# Patient Record
Sex: MALE | Race: BLACK OR AFRICAN AMERICAN | Employment: FULL TIME | URBAN - METROPOLITAN AREA
[De-identification: names, ages, dates, MRNs, and addresses within clinical notes are randomized per-mention and may not be internally consistent; named-entity substitution may affect disease eponyms.]

---

## 2020-09-21 ENCOUNTER — OFFICE VISIT (OUTPATIENT)
Dept: INTERNAL MEDICINE CLINIC | Age: 40
End: 2020-09-21
Payer: COMMERCIAL

## 2020-09-21 VITALS
SYSTOLIC BLOOD PRESSURE: 124 MMHG | HEIGHT: 70 IN | DIASTOLIC BLOOD PRESSURE: 82 MMHG | WEIGHT: 230 LBS | OXYGEN SATURATION: 98 % | BODY MASS INDEX: 32.93 KG/M2 | TEMPERATURE: 98.1 F | HEART RATE: 88 BPM

## 2020-09-21 PROBLEM — E66.9 OBESITY (BMI 30-39.9): Status: ACTIVE | Noted: 2020-09-21

## 2020-09-21 PROBLEM — J44.1 COPD WITH ACUTE EXACERBATION (HCC): Status: ACTIVE | Noted: 2020-09-21

## 2020-09-21 PROBLEM — E11.9 CONTROLLED TYPE 2 DIABETES MELLITUS WITHOUT COMPLICATION, WITHOUT LONG-TERM CURRENT USE OF INSULIN (HCC): Status: ACTIVE | Noted: 2020-09-21

## 2020-09-21 PROBLEM — F17.200 SMOKER: Status: ACTIVE | Noted: 2020-09-21

## 2020-09-21 PROCEDURE — 4004F PT TOBACCO SCREEN RCVD TLK: CPT | Performed by: FAMILY MEDICINE

## 2020-09-21 PROCEDURE — 99204 OFFICE O/P NEW MOD 45 MIN: CPT | Performed by: FAMILY MEDICINE

## 2020-09-21 PROCEDURE — G8427 DOCREV CUR MEDS BY ELIG CLIN: HCPCS | Performed by: FAMILY MEDICINE

## 2020-09-21 PROCEDURE — G8926 SPIRO NO PERF OR DOC: HCPCS | Performed by: FAMILY MEDICINE

## 2020-09-21 PROCEDURE — 3046F HEMOGLOBIN A1C LEVEL >9.0%: CPT | Performed by: FAMILY MEDICINE

## 2020-09-21 PROCEDURE — 3023F SPIROM DOC REV: CPT | Performed by: FAMILY MEDICINE

## 2020-09-21 PROCEDURE — 2022F DILAT RTA XM EVC RTNOPTHY: CPT | Performed by: FAMILY MEDICINE

## 2020-09-21 PROCEDURE — G8417 CALC BMI ABV UP PARAM F/U: HCPCS | Performed by: FAMILY MEDICINE

## 2020-09-21 RX ORDER — PREDNISONE 20 MG/1
20 TABLET ORAL 2 TIMES DAILY
Qty: 10 TABLET | Refills: 0 | Status: SHIPPED | OUTPATIENT
Start: 2020-09-21 | End: 2020-09-26

## 2020-09-21 RX ORDER — AZITHROMYCIN 250 MG/1
250 TABLET, FILM COATED ORAL SEE ADMIN INSTRUCTIONS
Qty: 6 TABLET | Refills: 0 | Status: SHIPPED | OUTPATIENT
Start: 2020-09-21 | End: 2020-09-26

## 2020-09-21 SDOH — HEALTH STABILITY: MENTAL HEALTH: HOW OFTEN DO YOU HAVE A DRINK CONTAINING ALCOHOL?: NEVER

## 2020-09-21 ASSESSMENT — ENCOUNTER SYMPTOMS
SORE THROAT: 1
COUGH: 1
EYES NEGATIVE: 1
GASTROINTESTINAL NEGATIVE: 1

## 2020-09-21 NOTE — PROGRESS NOTES
Subjective:      Patient ID: Rosas Miranda is a 42 y.o. male. Cough   This is a new problem. The current episode started in the past 7 days. The problem has been unchanged. The problem occurs constantly. The cough is non-productive. Associated symptoms include a sore throat. The symptoms are aggravated by fumes and pollens. Risk factors for lung disease include smoking/tobacco exposure. He has tried nothing for the symptoms. The treatment provided no relief. His past medical history is significant for bronchitis and environmental allergies. Review of Systems   Constitutional: Negative. HENT: Positive for sore throat. Eyes: Negative. Respiratory: Positive for cough. Cardiovascular: Negative. Gastrointestinal: Negative. Endocrine: Negative. Musculoskeletal: Negative. Skin: Negative. Allergic/Immunologic: Positive for environmental allergies. Neurological: Negative. Hematological: Negative. Psychiatric/Behavioral: Negative. Past family and social history unremarkable. Diagnosis Orders   1. Encounter to establish care with new doctor     2. COPD with acute exacerbation (HCC)  Urine Drug Screen    HIV Rapid 1&2    Hepatitis Panel, Acute    CBC    Comprehensive Metabolic Panel    Hemoglobin A1C    Lipid Panel    Urinalysis with Microscopic    TSH without Reflex    XR CHEST STANDARD (2 VW)    Covid-19, Antibody, Total    azithromycin (ZITHROMAX) 250 MG tablet   3. Controlled type 2 diabetes mellitus without complication, without long-term current use of insulin (HCC)  Urine Drug Screen    HIV Rapid 1&2    Hepatitis Panel, Acute    CBC    Comprehensive Metabolic Panel    Hemoglobin A1C    Lipid Panel    Urinalysis with Microscopic    TSH without Reflex    XR CHEST STANDARD (2 VW)    Covid-19, Antibody, Total    azithromycin (ZITHROMAX) 250 MG tablet   4.  Smoker  Urine Drug Screen    HIV Rapid 1&2    Hepatitis Panel, Acute    CBC    Comprehensive Metabolic Panel Acute    CBC    Comprehensive Metabolic Panel    Hemoglobin A1C    Lipid Panel    Urinalysis with Microscopic    TSH without Reflex    XR CHEST STANDARD (2 VW)    Covid-19, Antibody, Total    azithromycin (ZITHROMAX) 250 MG tablet   3. Controlled type 2 diabetes mellitus without complication, without long-term current use of insulin (HCC)  Urine Drug Screen    HIV Rapid 1&2    Hepatitis Panel, Acute    CBC    Comprehensive Metabolic Panel    Hemoglobin A1C    Lipid Panel    Urinalysis with Microscopic    TSH without Reflex    XR CHEST STANDARD (2 VW)    Covid-19, Antibody, Total    azithromycin (ZITHROMAX) 250 MG tablet   4. Smoker  Urine Drug Screen    HIV Rapid 1&2    Hepatitis Panel, Acute    CBC    Comprehensive Metabolic Panel    Hemoglobin A1C    Lipid Panel    Urinalysis with Microscopic    TSH without Reflex    XR CHEST STANDARD (2 VW)    Covid-19, Antibody, Total    azithromycin (ZITHROMAX) 250 MG tablet   5. Obesity (BMI 30-39. 9)  Urine Drug Screen    HIV Rapid 1&2    Hepatitis Panel, Acute    CBC    Comprehensive Metabolic Panel    Hemoglobin A1C    Lipid Panel    Urinalysis with Microscopic    TSH without Reflex    XR CHEST STANDARD (2 VW)    Covid-19, Antibody, Total    azithromycin (ZITHROMAX) 250 MG tablet   6. 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 Patient states that he recently had DOT physical and was told that he has bronchitis. He is afebrile hemodynamically stable  Signs and symptoms and history suggestive of COPD exacerbation, tracheobronchitis. I had a long discussion with this individual regarding importance of smoke cessation, quit date, alternative to consider. He is advised to push more oral fluid, salt water gargles, over-the-counter Tylenol. I have ordered chest x-rays and blood work and COVID-19 antibiotic.   I have also ordered azithromycin, burst oral

## 2020-09-21 NOTE — LETTER
[facility]  [address] [place] [address]  Phone: [phone]  Fax: [phone]    Barber Vanegas MD        September 21, 2020     Patient: Pratik Eason   YOB: 1980   Date of Visit: 9/21/2020       To Whom It May Concern: It is my medical opinion that Pratik Eason may return to full duty immediately with no restrictions. If you have any questions or concerns, please don't hesitate to call.     Sincerely,        Barber Vanegas MD

## 2020-09-22 ENCOUNTER — HOSPITAL ENCOUNTER (OUTPATIENT)
Age: 40
End: 2020-09-22
Payer: COMMERCIAL

## 2020-09-22 ENCOUNTER — HOSPITAL ENCOUNTER (OUTPATIENT)
Age: 40
Discharge: HOME | End: 2020-09-22
Payer: COMMERCIAL

## 2020-09-22 ENCOUNTER — HOSPITAL ENCOUNTER (OUTPATIENT)
Dept: GENERAL RADIOLOGY | Age: 40
Discharge: HOME | End: 2020-09-24
Payer: COMMERCIAL

## 2020-09-22 LAB
-: ABNORMAL
ALBUMIN SERPL-MCNC: 4.3 G/DL (ref 3.5–5.2)
ALBUMIN/GLOBULIN RATIO: 1.6 (ref 1–2.5)
ALP BLD-CCNC: 92 U/L (ref 40–129)
ALT SERPL-CCNC: 33 U/L (ref 5–41)
AMORPHOUS: ABNORMAL
ANION GAP SERPL CALCULATED.3IONS-SCNC: 17 MMOL/L (ref 9–17)
AST SERPL-CCNC: 19 U/L
BACTERIA: ABNORMAL
BILIRUB SERPL-MCNC: 0.3 MG/DL (ref 0.3–1.2)
BILIRUBIN URINE: NEGATIVE
BUN BLDV-MCNC: 15 MG/DL (ref 6–20)
BUN/CREAT BLD: ABNORMAL (ref 9–20)
CALCIUM SERPL-MCNC: 9.5 MG/DL (ref 8.6–10.4)
CASTS UA: ABNORMAL /LPF (ref 0–8)
CHLORIDE BLD-SCNC: 106 MMOL/L (ref 98–107)
CHOLESTEROL/HDL RATIO: 5.1
CHOLESTEROL: 233 MG/DL
CO2: 20 MMOL/L (ref 20–31)
COLOR: YELLOW
CREAT SERPL-MCNC: 0.83 MG/DL (ref 0.7–1.2)
CRYSTALS, UA: ABNORMAL /HPF
EPITHELIAL CELLS UA: ABNORMAL /HPF (ref 0–5)
ESTIMATED AVERAGE GLUCOSE: 212 MG/DL
GFR AFRICAN AMERICAN: >60 ML/MIN
GFR NON-AFRICAN AMERICAN: >60 ML/MIN
GFR SERPL CREATININE-BSD FRML MDRD: ABNORMAL ML/MIN/{1.73_M2}
GFR SERPL CREATININE-BSD FRML MDRD: ABNORMAL ML/MIN/{1.73_M2}
GLUCOSE BLD-MCNC: 174 MG/DL (ref 70–99)
GLUCOSE URINE: NEGATIVE
HAV IGM SER IA-ACNC: NONREACTIVE
HBA1C MFR BLD: 9 % (ref 4–6)
HCT VFR BLD CALC: 46.3 % (ref 40.7–50.3)
HDLC SERPL-MCNC: 46 MG/DL
HEMOGLOBIN: 14.7 G/DL (ref 13–17)
HEPATITIS B CORE IGM ANTIBODY: NONREACTIVE
HEPATITIS B SURFACE ANTIGEN: NONREACTIVE
HEPATITIS C ANTIBODY: NONREACTIVE
HIV AG/AB: NONREACTIVE
KETONES, URINE: NEGATIVE
LDL CHOLESTEROL: 160 MG/DL (ref 0–130)
LEUKOCYTE ESTERASE, URINE: NEGATIVE
MCH RBC QN AUTO: 27.8 PG (ref 25.2–33.5)
MCHC RBC AUTO-ENTMCNC: 31.7 G/DL (ref 28.4–34.8)
MCV RBC AUTO: 87.7 FL (ref 82.6–102.9)
MUCUS: ABNORMAL
NITRITE, URINE: NEGATIVE
NRBC AUTOMATED: 0 PER 100 WBC
OTHER OBSERVATIONS UA: ABNORMAL
PDW BLD-RTO: 13 % (ref 11.8–14.4)
PH UA: 6 (ref 5–8)
PLATELET # BLD: 277 K/UL (ref 138–453)
PMV BLD AUTO: 11.3 FL (ref 8.1–13.5)
POTASSIUM SERPL-SCNC: 4.4 MMOL/L (ref 3.7–5.3)
PROTEIN UA: ABNORMAL
RBC # BLD: 5.28 M/UL (ref 4.21–5.77)
RBC UA: ABNORMAL /HPF (ref 0–4)
RENAL EPITHELIAL, UA: ABNORMAL /HPF
SARS-COV-2 ANTIBODY, TOTAL: NEGATIVE
SODIUM BLD-SCNC: 143 MMOL/L (ref 135–144)
SPECIFIC GRAVITY UA: 1.02 (ref 1–1.03)
TOTAL PROTEIN: 7 G/DL (ref 6.4–8.3)
TRICHOMONAS: ABNORMAL
TRIGL SERPL-MCNC: 133 MG/DL
TSH SERPL DL<=0.05 MIU/L-ACNC: 2.13 MIU/L (ref 0.3–5)
TURBIDITY: CLEAR
URINE HGB: NEGATIVE
UROBILINOGEN, URINE: NORMAL
VLDLC SERPL CALC-MCNC: ABNORMAL MG/DL (ref 1–30)
WBC # BLD: 9.2 K/UL (ref 3.5–11.3)
WBC UA: ABNORMAL /HPF (ref 0–5)
YEAST: ABNORMAL

## 2020-09-22 PROCEDURE — 83036 HEMOGLOBIN GLYCOSYLATED A1C: CPT

## 2020-09-22 PROCEDURE — 71046 X-RAY EXAM CHEST 2 VIEWS: CPT

## 2020-09-22 PROCEDURE — 85027 COMPLETE CBC AUTOMATED: CPT

## 2020-09-22 PROCEDURE — 87389 HIV-1 AG W/HIV-1&-2 AB AG IA: CPT

## 2020-09-22 PROCEDURE — 80074 ACUTE HEPATITIS PANEL: CPT

## 2020-09-22 PROCEDURE — 84443 ASSAY THYROID STIM HORMONE: CPT

## 2020-09-22 PROCEDURE — 86769 SARS-COV-2 COVID-19 ANTIBODY: CPT

## 2020-09-22 PROCEDURE — 81001 URINALYSIS AUTO W/SCOPE: CPT

## 2020-09-22 PROCEDURE — 80061 LIPID PANEL: CPT

## 2020-09-22 PROCEDURE — 36415 COLL VENOUS BLD VENIPUNCTURE: CPT

## 2020-09-22 PROCEDURE — 80053 COMPREHEN METABOLIC PANEL: CPT

## 2020-09-25 ENCOUNTER — TELEPHONE (OUTPATIENT)
Dept: INTERNAL MEDICINE CLINIC | Age: 40
End: 2020-09-25

## 2020-09-25 NOTE — LETTER
[facility]  [address]  Phone: [phone]  Fax: [phone]    Retta Brunner, MD        September 28, 2020     Patient: Rosas Miranda   YOB: 1980   Date of Visit: 9/25/2020        Rosas Miranda, patient states he has been exposed to covid 19  with subjective complaint of cough. Quarantine 14 days,may return after  14 day quarantine. If you have any questions or concerns, please don't hesitate to call.     Sincerely,        Retta Brunner, MD

## 2020-09-25 NOTE — TELEPHONE ENCOUNTER
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

## 2020-09-28 NOTE — TELEPHONE ENCOUNTER
Please order COVID-19 screen.   Advised to contact flu clinic  Please provide letter per patient request-patient states that he had possible exposure to COVID-19 with subjective complaint of cough

## 2020-09-28 NOTE — TELEPHONE ENCOUNTER
Left message for Frances August to return call,need more information from [address] [facility] [address] he have sx,has their child  tested positive.

## 2020-09-28 NOTE — TELEPHONE ENCOUNTER
PATIENT NOTIFIED EXPRESSED UNDERSTANDING, per [address] and letter printed  and placed in dr Russel Bautista on desk,RX CALLED INTO PHARMACY.

## 2020-09-28 NOTE — TELEPHONE ENCOUNTER
Pt reports he has a cough, his child has not been tested for covid. Child in the home has cough/SOB. Pt is requesting note for work to state the need for 2 weeks quarantine. Please advise.

## 2020-10-08 ENCOUNTER — HOSPITAL ENCOUNTER (OUTPATIENT)
Age: 40
Setting detail: SPECIMEN
Discharge: HOME | End: 2020-10-08
Payer: COMMERCIAL

## 2020-10-09 ENCOUNTER — HOSPITAL ENCOUNTER (OUTPATIENT)
Dept: DIABETES SERVICES | Age: 40
Setting detail: THERAPIES SERIES
Discharge: HOME | End: 2020-10-09
Payer: COMMERCIAL

## 2020-10-09 ENCOUNTER — TELEPHONE (OUTPATIENT)
Dept: DIABETES SERVICES | Age: 40
End: 2020-10-09

## 2020-10-09 PROCEDURE — G0108 DIAB MANAGE TRN  PER INDIV: HCPCS

## 2020-10-09 RX ORDER — LANCETS 30 GAUGE
1 EACH MISCELLANEOUS 3 TIMES DAILY
Qty: 90 EACH | Refills: 5 | Status: SHIPPED | OUTPATIENT
Start: 2020-10-09

## 2020-10-09 RX ORDER — GLUCOSAMINE HCL/CHONDROITIN SU 500-400 MG
CAPSULE ORAL
Qty: 90 STRIP | Refills: 5 | Status: SHIPPED | OUTPATIENT
Start: 2020-10-09

## 2020-10-09 SDOH — ECONOMIC STABILITY: FOOD INSECURITY: ADDITIONAL INFORMATION: NO

## 2020-10-09 ASSESSMENT — PROBLEM AREAS IN DIABETES QUESTIONNAIRE (PAID)
PAID-5 TOTAL SCORE: 5
FEELING THAT DIABETES IS TAKING UP TOO MUCH OF YOUR MENTAL AND PHYSICAL ENERGY EVERY DAY: 2
WORRYING ABOUT THE FUTURE AND THE POSSIBILITY OF SERIOUS COMPLICATIONS: 1
FEELING SCARED WHEN YOU THINK ABOUT LIVING WITH DIABETES: 0
COPING WITH COMPLICATIONS OF DIABETES: 1
FEELING DEPRESSED WHEN YOU THINK ABOUT LIVING WITH DIABETES: 1

## 2020-10-09 NOTE — PROGRESS NOTES
Please  Note patient DMED assessment done today  - Patient need order for a BG meter and strips and lancets for testing BG at home -   he would like to test 2 - 3 times per day and bring a BG log into you at next visit. He has follow DMED session planned for 4 weeks -He is working on goals to eat less CHO / starchy foods and be more active daily. He is taking metformin 1000 mg BID daily now and tolerating.     Thanks  Rody Laura RN CDE

## 2020-10-09 NOTE — PROGRESS NOTES
Diabetes Self- Management Education Program Assessment - PHONE  This visit was done on the telephone  (During LZHND-27 public health emergency). Pursuant to the emergency declaration under the [facility], [address] [place] [address] authority and the [employer] Act, this visit was conducted with patient's (and/or legal guardian's) consent, to reduce the patient's risk of exposure to COVID-19 and provide necessary medical care. The patient (and/or legal guardian) has also been advised to contact this office for worsening conditions or problems, and seek emergency medical treatment and/or call 911 if deemed necessary. Patient identification was verified at the start of the visit: Yes    Total time spent for this encounter: 1 hour  - this encounter was done as one on one virtual /  phone visit as no group sessions being offered for 2 months due to covid - 19 pandemic    Services were provided through a phone discussion to substitute for in-person clinic visit. Patient and provider were located at their individual home/office. Also see Diabetic Screening  Patient, Andree Smith, contacted via phone call encounters for diabetes self-management education assessment on 10/9/20       MEDICAL HISTORY:  No past medical history on file. No family history on file. Patient has no known allergies. There is no immunization history on file for this patient.   Current Medications  Current Outpatient Medications   Medication Sig Dispense Refill    metFORMIN (GLUCOPHAGE) 1000 MG tablet Take 1 tablet by mouth 2 times daily (with meals) 60 tablet 0    fluticasone-umeclidin-vilant (TRELEGY ELLIPTA) 100-62.5-25 MCG/INH AEPB Inhale 1 puff into the lungs daily 1 each 0    Spacer/Aero-Holding Chambers (E-Z SPACER) ZULAY 1 Device by Does not apply route daily as needed (sob) 1 Device 0     No current facility-administered medications for this encounter.    :     Comments:  Allergies:  No Known Allergies    A1C blood level   Lab Results   Component Value Date    LABA1C 9.0 (H) 09/22/2020     Lab Results   Component Value Date    CREATININE 0.83 09/22/2020       Blood pressure   BP Readings from Last 3 Encounters:   09/21/20 124/82        Cholesterol  Lab Results   Component Value Date    LDLCHOLESTEROL 160 (H) 09/22/2020        Diabetes Self- Management Education Record    Participant Name: Ricky Barboza  Referring Provider: Lashonda Esparza MD   Assessment/Evaluation Ratings:  1=Needs Instruction   4=Demonstrates Understanding/Competency  2=Needs Review   NC=Not Covered    3=Comprehends Key Points  N/A=Not Applicable  Topics/Learning Objectives Pre-session Assess Date:  10/9/20rs Instr. Date Reinforce Date Post- session Eval Comments   Diabetes disease process & Treatment process: Define diabetes & pre-diabetes; Identify own type of diabetes; role of the pancreas; signs/symptoms; diagnostic criteria; prevention & treatment options; contributing factors. 2    Stated DM DX in 2007 initally - as he left service - tried metformin did not work, then was told was type 1 and was on lantus and humalog ( low doses) - but then worked on wt loss by low CHO and being active and was stopped all meds- more recent 2020   changed jobs and coivd restiction and less activity - then had symptoms of dm - fatigue and frequent- PCP care in Sept and a1c up to 9%    Incorporating nutritional management into lifestyle: Describe effect of type, amount & timing of food on blood glucose;  Describe basic meal planning techniques & current nutrition guideline     Work odd hours no regular times     Main beverages water - kambucha lemonade - try to stay away from pop - some cranberry juices and Gatorade    Likes to fruits and veggies     Meals at home -  BK  eggs and cheese and pepper  BK   Steaks and 2 has brown and grits     Dinner :   cheese steak from take out What to eat - Food groups, When to eat - timing of meals and snacks, and How much to eat - portions control. calories/ day   CHO choices/ meal   CHO choices/  day   grams of protein /day   gram of fat /day     Correctly read food labels & demonstrate CHO counting & portion control with personalized meal plan. Identify dining out strategies, & dietary sick day guidelines. 1       Incorporating physical activity into lifestyle:   Verbalize effect of exercise on blood glucose levels; benefits of regular exercise; safety considerations; contraindications; maintenance of activity. 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 2    - insulin in past   Monitoring blood glucose, interpreting and using results:  Identify recommended & personal blood glucose targets; importance of testing; testing supplies; HgbA1C target levels; Factors affecting blood glucose; Importance of logging blood glucose levels for pattern recognition; ketone testing; safe lancet disposal.   2    Check BG  In past  - needs a meter - requested meter and ordered by pcp today - discussed plan to check BG fasting and post meals and take BG log to next pcp appt 10/20     Prevention, detection & treatment of acute complications:  Identify symptoms of hyper & hypoglycemia, and prevention & treatment strategies. 1    Has classic high s/s   Describe sick day guidelines & indications for  physician notification. Identify short term consequences of poor control.  Disaster preparedness strategies           Prevention, detection & treatment of chronic complications:  Define the natural course of diabetes & describe the relationship of blood glucose levels to long term complications of diabetes. Identify preventative measures & standards of care. 1    -stated he did see foot MD and N/t in feet and c/o about nail beds  - eye checked done yearly at [place]      Developing strategies to address psychosocial issues:  Describe feelings about living with diabetes; Describe how stress, depression & anxiety affect blood glucose; Identify coping strategies; Identify support needed & support network available. 1    - he was hard on self  - need to work on motivation - wife   - has apps in past - thinking about restarting tracking   Paid 5    Developing strategies to promote health/change behavior: Identify 7 self-care behaviors; Personal health risk factors; Benefits, challenges & strategies for behavioral change;    1         Individualized goal selection. My goal , to help me improve my health, I will:   1. Healthy eating -  Cut back on portions of starchy foods like potatotes      2. Monitoring  - start to check and log BG and take log to follow up PCP     3. Be more active - use bike for PA and keep up with walking more step/ short bouts daily        Plan  Follow-up Appointments planned  In one month  - patient stated he want to be accountable for his goals and to keep up with motivation    Instruction Method: [x]Lecture/Discussion  []Power Point Presentation  [x]Handouts  []Return Demonstration    Education Materials/Equipment Provided (VIA Mail for phone visits)  :    [x]Self-Management - Initial assessment - Enrolment in to ADA  Where do I Begin, Living with Type 2 diabetes ADA home support program and  handout on diabetes education classes.      []Self-Management  Class 1 -Self-Management  Class 1 - \"Diabetes - your take control guide\" - ADA booklet -  o  \"ready, set, start counting\" teaching sheet in diet section   o  GLP-1 understanding 8 core deficits puzzle sheet in the medication section  o one day food diary and envelope for return of diet HX   o  You tube and website resource sheet-Understanding Type 2 Diabetes from Animated Diabetes Patient https://youtu. be/WCjDu33wRNB      [] Self-Management  Class 2 - Meal Plan and handout for serving sizes, smarter snacking, Ready Set Carb Counting / Plate Method, Nutrient Conversion and International Diabetes [address] Eating for People with Diabetes and Nutrition in the [employer] - fast facts about fast food    [] Self-Management  Class 3 -  Diabetes your take control guide pages 20 - 30,  type 2 diabetes and the role of GLP- 1,  Individualized Diabetes report card     [] Self-Management Class 4 - BD Booklet  Sick Day Rules and  Dinning Out Guide , recipe hand outs and tips, diabetes Cookbooks  ( when available)     []Self-Management - 3 month follow -  AADE7 Self care behaviors work sheets, 2020 [employer],  Online resource list - March 2020      []Self-Management  Gestational - RN class -Resource materials sent out : care booklet - \" Gestational Diabetes Mellitus ( GDM) toolkit form [place] gestational diabetes postpartum care learning collaborative 2018. \"Simple Guidelines for meal planning with gestational diabetes. SMBG sheets to fax back to MFM weekly. BD  healthy injection site selection and rotation with 6 mm insulin syringe and 4 mm pen needle. Gestational diabetes handout from [facility] 2016. Did you have gestational diabetes when you were pregnant?  Handout from [facility]  April 2014    []Self-Management Gestational - RD class - My Food Plan for Gestational diabetes    []Glucose Meter     []Insulin Kit     []Other      Encounter Type Date Start Time End Time Comments No Show Dates   Assessment   10/9/20rs  1000  1100    []In Person  [x]Telephone    Class 1 - Understanding diabetes []TelephoneAmerican Diabetes Association  www. diabetes. org    Class 2- Nutrition and diabetes      []Telephone  Healthy Eating with Diabetes- Automatic Data of Diabetes and Digestive and Kidney   https://youtu. be/aa4yl1Uu6E9    Class 3 - Preventing Complications     []Telephone    Class 4 -  In depth Nutrition and sick day care    []Telephone  Diabetes Food hub  www. diabetesfoodhub.org     Class 5 - 3 month follow up / goal reassessment        Gestational - RN         Gestational - RD        Individual MNT         Shared Med Appt         Yearly Follow-up        Meter Instrx      How to Measure Your Blood Sugar - [facility] Patient Education  https://youtu. be/nxIJeHWlhF4    Insulin Instrx      []Pen  []Vial & Syringe   BD Diabetes Care: How to Inject Insulin with a Pen Needle  https://youtu. be/[email or web address]    Diabetes Care: How to Inject Insulin with a Syringe  https://youtu. be/9uSSBu-5eSY       DSMS Support :   [] MNT      [] Annual update     [] Starting Fresh  adults living with diabetes or pre diabetes. [address] [place], [place]    [id number]:30pm- on 6 week rotation  Free -  REGISTRATION IS REQUIRED - [facility] [phone] call for dates    []  Diabetes Group at  [address] - Free 6 week diabetes education support   classes - contact : Gia Johnson [phone]  for dates and locations      []ADA  Where do I Begin, Living with Type 2 diabetes ADA home support program    Web site: diabetes. org/living    Call: 1800 DIABETES  e-mail: Nelsy@[employer]. org     []  Internet web sites - ADAWeb site: www.diabetes. org       Post Education Referrals:      [] [place] Tobacco Quit information sheet and [address] , [address]       [] Dental care - [facility]     [] [facility] ([facility]) link  phone number - for information and referral to [address]  Clinically  [address], FOOT, CARDIAC, WOUND, WEIGHT MANAGEMENT        []Other  REKHA

## 2020-10-11 LAB — SARS-COV-2, NAA: NOT DETECTED

## 2020-10-12 ENCOUNTER — TELEPHONE (OUTPATIENT)
Dept: PRIMARY CARE CLINIC | Age: 40
End: 2020-10-12

## 2020-10-19 NOTE — TELEPHONE ENCOUNTER
Alejandro Giron is calling to request a refill on the following medication(s):    Medication Request:  Requested Prescriptions     Pending Prescriptions Disp Refills    metFORMIN (GLUCOPHAGE) 1000 MG tablet [Pharmacy Med Name: METFORMIN HCL 1,000 MG TABLET] 60 tablet 0     Sig: TAKE 1 TABLET BY MOUTH TWICE A DAY WITH MEALS     Last filled 9/23/20 30 day no refill  Last Visit Date (If Applicable):  9/59/9320    Next Visit Date:    10/20/2020

## 2020-10-20 ENCOUNTER — OFFICE VISIT (OUTPATIENT)
Dept: INTERNAL MEDICINE CLINIC | Age: 40
End: 2020-10-20
Payer: COMMERCIAL

## 2020-10-20 VITALS
DIASTOLIC BLOOD PRESSURE: 70 MMHG | RESPIRATION RATE: 20 BRPM | HEART RATE: 93 BPM | WEIGHT: 231 LBS | SYSTOLIC BLOOD PRESSURE: 118 MMHG | BODY MASS INDEX: 33.07 KG/M2 | TEMPERATURE: 97.1 F | HEIGHT: 70 IN | OXYGEN SATURATION: 98 %

## 2020-10-20 PROBLEM — J44.9 COPD (CHRONIC OBSTRUCTIVE PULMONARY DISEASE) (HCC): Status: ACTIVE | Noted: 2020-10-20

## 2020-10-20 PROBLEM — J44.1 COPD WITH ACUTE EXACERBATION (HCC): Status: RESOLVED | Noted: 2020-09-21 | Resolved: 2020-10-20

## 2020-10-20 PROBLEM — E11.9 CONTROLLED TYPE 2 DIABETES MELLITUS WITHOUT COMPLICATION, WITHOUT LONG-TERM CURRENT USE OF INSULIN (HCC): Status: RESOLVED | Noted: 2020-09-21 | Resolved: 2020-10-20

## 2020-10-20 PROBLEM — E78.2 MIXED HYPERLIPIDEMIA: Status: ACTIVE | Noted: 2020-10-20

## 2020-10-20 PROBLEM — I10 ESSENTIAL HYPERTENSION: Status: ACTIVE | Noted: 2020-10-20

## 2020-10-20 PROBLEM — E11.65 UNCONTROLLED TYPE 2 DIABETES MELLITUS WITH HYPERGLYCEMIA (HCC): Status: ACTIVE | Noted: 2020-10-20

## 2020-10-20 PROCEDURE — G8926 SPIRO NO PERF OR DOC: HCPCS | Performed by: FAMILY MEDICINE

## 2020-10-20 PROCEDURE — 3052F HG A1C>EQUAL 8.0%<EQUAL 9.0%: CPT | Performed by: FAMILY MEDICINE

## 2020-10-20 PROCEDURE — 99214 OFFICE O/P EST MOD 30 MIN: CPT | Performed by: FAMILY MEDICINE

## 2020-10-20 PROCEDURE — 4004F PT TOBACCO SCREEN RCVD TLK: CPT | Performed by: FAMILY MEDICINE

## 2020-10-20 PROCEDURE — G8484 FLU IMMUNIZE NO ADMIN: HCPCS | Performed by: FAMILY MEDICINE

## 2020-10-20 PROCEDURE — 3023F SPIROM DOC REV: CPT | Performed by: FAMILY MEDICINE

## 2020-10-20 PROCEDURE — G8427 DOCREV CUR MEDS BY ELIG CLIN: HCPCS | Performed by: FAMILY MEDICINE

## 2020-10-20 PROCEDURE — 90471 IMMUNIZATION ADMIN: CPT | Performed by: FAMILY MEDICINE

## 2020-10-20 PROCEDURE — 90732 PPSV23 VACC 2 YRS+ SUBQ/IM: CPT | Performed by: FAMILY MEDICINE

## 2020-10-20 PROCEDURE — 2022F DILAT RTA XM EVC RTNOPTHY: CPT | Performed by: FAMILY MEDICINE

## 2020-10-20 PROCEDURE — G8417 CALC BMI ABV UP PARAM F/U: HCPCS | Performed by: FAMILY MEDICINE

## 2020-10-20 RX ORDER — ATORVASTATIN CALCIUM 40 MG/1
40 TABLET, FILM COATED ORAL DAILY
Qty: 90 TABLET | Refills: 0 | Status: SHIPPED | OUTPATIENT
Start: 2020-10-20 | End: 2021-01-18

## 2020-10-20 RX ORDER — ASPIRIN 81 MG/1
81 TABLET ORAL DAILY
Qty: 200 TABLET | Refills: 1 | Status: SHIPPED | OUTPATIENT
Start: 2020-10-20

## 2020-10-20 RX ORDER — LISINOPRIL 5 MG/1
5 TABLET ORAL DAILY
Qty: 90 TABLET | Refills: 1 | Status: SHIPPED | OUTPATIENT
Start: 2020-10-20 | End: 2021-05-25

## 2020-10-20 ASSESSMENT — PATIENT HEALTH QUESTIONNAIRE - PHQ9
SUM OF ALL RESPONSES TO PHQ9 QUESTIONS 1 & 2: 0
1. LITTLE INTEREST OR PLEASURE IN DOING THINGS: 0
SUM OF ALL RESPONSES TO PHQ QUESTIONS 1-9: 0
2. FEELING DOWN, DEPRESSED OR HOPELESS: 0
SUM OF ALL RESPONSES TO PHQ QUESTIONS 1-9: 0
SUM OF ALL RESPONSES TO PHQ QUESTIONS 1-9: 0

## 2020-10-20 ASSESSMENT — ENCOUNTER SYMPTOMS
ALLERGIC/IMMUNOLOGIC NEGATIVE: 1
GASTROINTESTINAL NEGATIVE: 1
EYES NEGATIVE: 1
COUGH: 1

## 2020-10-20 ASSESSMENT — COPD QUESTIONNAIRES: COPD: 1

## 2020-10-20 NOTE — LETTER
[facility]  [address] [place] [address]  Phone: [phone]  Fax: [phone]    Ofelia Booker MD        October 20, 2020     Patient: Leena Rice   YOB: 1980   Date of Visit: 10/20/2020       To Whom it May Concern:    Leena Rice was seen in my clinic on 10/20/2020. He may return to work on 10/21/20. If you have any questions or concerns, please don't hesitate to call.     Sincerely,         Ofelia Booker MD

## 2020-10-20 NOTE — PROGRESS NOTES
Subjective:      Patient ID: Alverto Gusman is a 42 y.o. male. COPD   He complains of cough. This is a chronic problem. The current episode started more than 1 month ago. The problem occurs intermittently. The problem has been waxing and waning. The cough is non-productive. His symptoms are aggravated by exposure to smoke, change in weather, pollen and exposure to fumes. His symptoms are alleviated by beta-agonist, steroid inhaler and leukotriene antagonist. He reports moderate improvement on treatment. Risk factors for lung disease include smoking/tobacco exposure. His past medical history is significant for COPD. Review of Systems   Constitutional: Negative. HENT: Negative. Eyes: Negative. Respiratory: Positive for cough. Cardiovascular: Negative. Gastrointestinal: Negative. Endocrine: Negative. Musculoskeletal: Negative. Skin: Negative. Allergic/Immunologic: Negative. Neurological: Negative. Hematological: Negative. Psychiatric/Behavioral: Negative. Past family and social history unremarkable. Diagnosis Orders   1. Uncontrolled type 2 diabetes mellitus with hyperglycemia [address]   2. Smoker     3. Obesity (BMI 30-39.9)     4. Chronic obstructive pulmonary disease, unspecified COPD type ([address]     5. Need for pneumococcal vaccination  PNEUMOVAX 23 subcutaneous/IM (Pneumococcal polysaccharide vaccine 23-valent >= 3yo)   6. Need for Tdap vaccination  Tetanus-Diphth-Acell Pertussis (BOOSTRIX) injection 0.5 mL   7. Mixed hyperlipidemia     8. Essential hypertension           Objective:   Physical Exam  Vitals signs and nursing note reviewed. Constitutional:       Appearance: He is well-developed. HENT:      Head: Normocephalic and atraumatic.       Right Ear: External ear normal.      Left Ear: External ear normal.      Nose: Nose normal.   Eyes:      Conjunctiva/sclera: Conjunctivae normal.      Pupils: Pupils are equal, round, advised to keep Accu-Chek log for office review, adhere to ADA 1800 diet, daily moderate exercise and lifestyle change  Hypertension. Well-controlled with random blood pressure equal less than 130/80. Will be placed on low-dose ACE inhibitor  Hyperlipidemia. Emphasized importance of euglycemia. He will be placed on statin. Caution myalgia and elevated LFTs  He will be placed on low-dose aspirin  Monofilament testing is unremarkable. He is counseled on diabetic foot care  He is updated on dilated eye exam.  No diabetic changes as per patient information  Patient states that in the past he was on insulin and brought his A1c down to 6.5. We will continue the remain open to that option  COPD. He continues to smoke. Once again he is counseled, quit date, alternative to consider. He is responding appropriately to Trelegy  Depression screen is negative  He denies excessive alcohol or illicit drug use  Met distribute terminal adjustment as dictated above. Compliance is advised. He is advised to bring Accu-Chek log at each visit  Follow-up in 3 months  This note is created with a voice recognition program and while intend to generate a document that accurately reflects the content of the visit, no guarantee can be provided that every mistake has been identified and corrected by editing.           Rosana Frazier MD

## 2020-11-28 ENCOUNTER — PATIENT MESSAGE (OUTPATIENT)
Dept: INTERNAL MEDICINE CLINIC | Age: 40
End: 2020-11-28

## 2020-11-30 NOTE — TELEPHONE ENCOUNTER
Robin Franco is calling to request a refill on the following medication(s):    Medication Request:  Requested Prescriptions     Pending Prescriptions Disp Refills    metFORMIN (GLUCOPHAGE) 1000 MG tablet [Pharmacy Med Name: METFORMIN HCL 1,000 MG TABLET] 60 tablet 0     Sig: TAKE 1 TABLET BY MOUTH TWICE A DAY WITH MEALS     Last filled 10/19/20 30 day  Order pending    Last Visit Date (If Applicable):  81/12/0106    Next Visit Date:    01/19/21

## 2020-11-30 NOTE — TELEPHONE ENCOUNTER
From: Marya Woodruff  To: Belvie Jeans, MD  Sent: 11/28/2020 11:57 AM EST  Subject: Prescription Question    Is there anyway I could have my metformin refilled today? I do not have enough to last the weekend. ..

## 2021-02-09 RX ORDER — ATORVASTATIN CALCIUM 40 MG/1
TABLET, FILM COATED ORAL
Qty: 20 TABLET | Refills: 0 | Status: SHIPPED | OUTPATIENT
Start: 2021-02-09 | End: 2021-03-22

## 2021-02-09 NOTE — TELEPHONE ENCOUNTER
Finn Gresham is calling to request a refill on the following medication(s):    Medication Request:  Requested Prescriptions     Pending Prescriptions Disp Refills    atorvastatin (LIPITOR) 40 MG tablet 90 tablet 1     Sig: TAKE 1 TABLET BY MOUTH EVERY DAY       Last Visit Date (If Applicable):  13/85/2370    Next Visit Date:    Visit date not found

## 2021-02-22 DIAGNOSIS — E11.65 UNCONTROLLED TYPE 2 DIABETES MELLITUS WITH HYPERGLYCEMIA (HCC): ICD-10-CM

## 2021-02-22 NOTE — TELEPHONE ENCOUNTER
Natalia Tobin is calling to request a refill on the following medication(s):    Medication Request:  Requested Prescriptions     Pending Prescriptions Disp Refills    metFORMIN (GLUCOPHAGE) 1000 MG tablet [Pharmacy Med Name: METFORMIN HCL 1,000 MG TABLET] 180 tablet 0     Sig: TAKE 1 TABLET BY MOUTH TWICE A DAY WITH MEALS     Last filled 11/30/20 90 day no refill  Order pending    Last Visit Date (If Applicable):  40/23/1068    Next Visit Date:    3/11/2021

## 2021-02-25 ENCOUNTER — HOSPITAL ENCOUNTER (EMERGENCY)
Age: 41
Discharge: HOME | End: 2021-02-26
Attending: EMERGENCY MEDICINE
Payer: COMMERCIAL

## 2021-02-25 VITALS
HEIGHT: 70 IN | OXYGEN SATURATION: 100 % | SYSTOLIC BLOOD PRESSURE: 144 MMHG | BODY MASS INDEX: 32.93 KG/M2 | TEMPERATURE: 98 F | DIASTOLIC BLOOD PRESSURE: 84 MMHG | WEIGHT: 230 LBS | RESPIRATION RATE: 16 BRPM | HEART RATE: 91 BPM

## 2021-02-25 DIAGNOSIS — F41.1 GENERALIZED ANXIETY DISORDER: Primary | ICD-10-CM

## 2021-02-25 PROCEDURE — 99284 EMERGENCY DEPT VISIT MOD MDM: CPT

## 2021-02-25 RX ORDER — LORAZEPAM 1 MG/1
1 TABLET ORAL ONCE
Status: COMPLETED | OUTPATIENT
Start: 2021-02-26 | End: 2021-02-26

## 2021-02-26 PROCEDURE — 6370000000 HC RX 637 (ALT 250 FOR IP): Performed by: EMERGENCY MEDICINE

## 2021-02-26 RX ORDER — ALPRAZOLAM 0.5 MG/1
0.5 TABLET ORAL NIGHTLY PRN
Qty: 10 TABLET | Refills: 0 | Status: SHIPPED | OUTPATIENT
Start: 2021-02-26 | End: 2021-03-28

## 2021-02-26 RX ADMIN — LORAZEPAM 1 MG: 1 TABLET ORAL at 00:17

## 2021-02-26 NOTE — ED PROVIDER NOTES
EMERGENCY DEPARTMENT ENCOUNTER    Pt Name: Ellyn Jane  MRN: [id number]  [place] 1980  Date of evaluation: 2/26/21  CHIEF COMPLAINT       Chief Complaint   Patient presents with    Panic Attack    Mental Health Problem     not sure if he wants to hurt himself, has felt this way before. appt scheduled at [facility] but not for another month     HISTORY OF PRESENT ILLNESS   Patient is a 79-year-old male presents to ED for evaluation of anxiety. 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 He states he is wife just lost a child. He has 6 daughters at home. He is a . Accumulation of life stressors has increased his anxiety. SI, HI, hallucinations. Also no fevers, cough, shortness of breath, chest pain, abdominal pain, nausea, vomiting, changes in urine or stool. REVIEW OF SYSTEMS     Review of Systems   All other systems reviewed and are negative. PASTMEDICAL HISTORY   History reviewed. No pertinent past medical history.   SURGICAL HISTORY       Past Surgical History:   Procedure Laterality Date    PATELLAR TENDON REPAIR       CURRENT MEDICATIONS       Previous Medications    ASPIRIN EC 81 MG EC TABLET    Take 1 tablet by mouth daily    ATORVASTATIN (LIPITOR) 40 MG TABLET    TAKE 1 TABLET BY MOUTH EVERY DAY    BLOOD GLUCOSE MONITOR STRIPS    Test 3 times a day    BLOOD GLUCOSE MONITORING SUPPL (ACURA BLOOD GLUCOSE METER) W/DEVICE KIT    Test 2-3 times a day    FLUTICASONE-UMECLIDIN-VILANT (TRELEGY ELLIPTA) 100-62.5-25 MCG/INH AEPB    Inhale 1 puff into the lungs daily    LANCETS MISC    1 each by Does not apply route 3 times daily    LISINOPRIL (PRINIVIL;ZESTRIL) 5 MG TABLET    Take 1 tablet by mouth daily    METFORMIN (GLUCOPHAGE) 1000 MG TABLET    TAKE 1 TABLET BY MOUTH TWICE A DAY WITH MEALS    SITAGLIPTIN (JANUVIA) 100 MG TABLET    Take 1 tablet by mouth daily    SPACER/AERO-HOLDING CHAMBERS (E-Z SPACER) ZULAY    1 Device by Does not apply route daily as needed (sob)     ALLERGIES     has No Known Allergies. FAMILY HISTORY     He indicated that his mother is alive. He indicated that his father is alive. SOCIAL HISTORY       Social History     Tobacco Use    Smoking status: Current Every Day Smoker     Packs/day: 1.00     Years: 19.00     Pack years: 19.00     Types: Cigarettes     Start date: 9/21/2001    Smokeless tobacco: Never Used   Substance Use Topics    Alcohol use: Never     Frequency: Never    Drug use: Never     PHYSICAL EXAM     INITIAL VITALS: BP (!) 144/84   Pulse 91   Temp 98 °F (36.7 °C) (Oral)   Resp 16   Ht 5' 10\" (1.778 m)   Wt 230 lb (104.3 kg)   SpO2 100%   BMI 33.00 kg/m²    Physical Exam  HENT:      Head: Normocephalic. Right Ear: External ear normal.      Left Ear: External ear normal.      Nose: Nose normal.   Eyes:      Conjunctiva/sclera: Conjunctivae normal.   Cardiovascular:      Rate and Rhythm: Normal rate. Pulmonary:      Effort: Pulmonary effort is normal.   Abdominal:      General: Abdomen is flat. Skin:     General: Skin is dry. Neurological:      Mental Status: He is alert. Mental status is at baseline. Psychiatric:         Mood and Affect: Mood normal.         Behavior: Behavior normal.      Comments:     Limited exam secondary to Covid-19 pandemic. MEDICAL DECISION MAKING:   The patient is hemodynamically stable, afebrile, nontoxic-appearing. Physical exam unremarkable. Based on history and exam high clinical suspicion for anxiety/PTSD. ED plan for Ativan, reassess         DIAGNOSTIC RESULTS   EKG:All EKG's are interpreted by the Emergency Department Physician who either signs or Co-signs this chart in the absence of a cardiologist.        RADIOLOGY:All plain film, CT, MRI, and formal ultrasound images (except ED bedside ultrasound) are read by the radiologist, see reports below, unless otherwisenoted in MDM or here.   No orders to display     LABS: All lab results were reviewed by myself, and all abnormals are listed below. Labs Reviewed - No data to display    EMERGENCY DEPARTMENTCOURSE:   Patient did well in the ED. Symptoms improved with Ativan. Given Rx for Xanax. Counseled his wife that he cannot operate his truck or other heavy machinery while taking this medication. He has a follow-up appointment at the [facility]. On reexamination no SI, HI, or hallucinations. Patient does not pose a risk to self or others. No further work-up indicated this time. Nursing notes reviewed. At this time this is what I find, the patient appears well and does not appear sick or toxic. I gave my usual and customary discussion of the risks and benefits of discharge versus admission. I answered the patient's questions. I gave the patient strict return precautions. Patient expressed understanding of the discharge instructions. Dictated but not reviewed. Vitals:    Vitals:    02/25/21 2337 02/25/21 2340   BP:  (!) 144/84   Pulse: 91    Resp: 16    Temp: 98 °F (36.7 °C)    TempSrc: Oral    SpO2: 100%    Weight: 230 lb (104.3 kg)    Height: 5' 10\" (1.778 m)        The patient was given the following medications while in the emergency department:  Orders Placed This Encounter   Medications    LORazepam (ATIVAN) tablet 1 mg    ALPRAZolam (XANAX) 0.5 MG tablet     Sig: Take 1 tablet by mouth nightly as needed for Anxiety for up to 30 days. Do not drive your truck or other vehicles/heavy machinery on this medication. Do not take alcohol while taking this medication. Dispense:  10 tablet     Refill:  0     CONSULTS:  None    FINAL IMPRESSION      1.  Generalized anxiety disorder          DISPOSITION/PLAN   DISPOSITION Decision To Discharge 02/26/2021 12:38:23 AM      PATIENT REFERRED TO:  Patrick Wagner MD  [address] [address] [facility] [address]  [phone]    In 2 days      DISCHARGE MEDICATIONS:  New Prescriptions ALPRAZOLAM (XANAX) 0.5 MG TABLET    Take 1 tablet by mouth nightly as needed for Anxiety for up to 30 days. Do not drive your truck or other vehicles/heavy machinery on this medication. Do not take alcohol while taking this medication.      Gerilyn Dakin, MD  Attending Emergency Physician                    Donna Smyth MD  02/26/21 1642

## 2021-03-01 ENCOUNTER — OFFICE VISIT (OUTPATIENT)
Dept: INTERNAL MEDICINE CLINIC | Age: 41
End: 2021-03-01
Payer: COMMERCIAL

## 2021-03-01 ENCOUNTER — HOSPITAL ENCOUNTER (OUTPATIENT)
Age: 41
Setting detail: SPECIMEN
Discharge: HOME | End: 2021-03-01
Payer: COMMERCIAL

## 2021-03-01 VITALS
OXYGEN SATURATION: 98 % | RESPIRATION RATE: 16 BRPM | HEIGHT: 70 IN | DIASTOLIC BLOOD PRESSURE: 90 MMHG | TEMPERATURE: 97.3 F | HEART RATE: 83 BPM | BODY MASS INDEX: 32.64 KG/M2 | WEIGHT: 228 LBS | SYSTOLIC BLOOD PRESSURE: 130 MMHG

## 2021-03-01 DIAGNOSIS — F39 MOOD DISORDER (HCC): ICD-10-CM

## 2021-03-01 DIAGNOSIS — E11.65 UNCONTROLLED TYPE 2 DIABETES MELLITUS WITH HYPERGLYCEMIA (HCC): ICD-10-CM

## 2021-03-01 DIAGNOSIS — E66.9 OBESITY (BMI 30-39.9): ICD-10-CM

## 2021-03-01 DIAGNOSIS — E78.2 MIXED HYPERLIPIDEMIA: ICD-10-CM

## 2021-03-01 DIAGNOSIS — E11.65 UNCONTROLLED TYPE 2 DIABETES MELLITUS WITH HYPERGLYCEMIA (HCC): Primary | ICD-10-CM

## 2021-03-01 DIAGNOSIS — J44.9 CHRONIC OBSTRUCTIVE PULMONARY DISEASE, UNSPECIFIED COPD TYPE (HCC): ICD-10-CM

## 2021-03-01 DIAGNOSIS — G89.29 CHRONIC PAIN OF RIGHT KNEE: ICD-10-CM

## 2021-03-01 DIAGNOSIS — I10 ESSENTIAL HYPERTENSION: ICD-10-CM

## 2021-03-01 DIAGNOSIS — F17.200 SMOKER: ICD-10-CM

## 2021-03-01 DIAGNOSIS — M25.561 CHRONIC PAIN OF RIGHT KNEE: ICD-10-CM

## 2021-03-01 LAB
CHOLESTEROL/HDL RATIO: 4.2
CHOLESTEROL: 191 MG/DL
CREATININE URINE: 298.4 MG/DL (ref 39–259)
ESTIMATED AVERAGE GLUCOSE: 220 MG/DL
HAV IGM SER IA-ACNC: NONREACTIVE
HBA1C MFR BLD: 9.3 % (ref 4–6)
HDLC SERPL-MCNC: 45 MG/DL
HEPATITIS B CORE IGM ANTIBODY: NONREACTIVE
HEPATITIS B SURFACE ANTIGEN: NONREACTIVE
HEPATITIS C ANTIBODY: NONREACTIVE
LDL CHOLESTEROL: 125 MG/DL (ref 0–130)
MICROALBUMIN/CREAT 24H UR: 51 MG/L
MICROALBUMIN/CREAT UR-RTO: 17 MCG/MG CREAT
TRIGL SERPL-MCNC: 104 MG/DL
TSH SERPL DL<=0.05 MIU/L-ACNC: 0.97 MIU/L (ref 0.3–5)
VLDLC SERPL CALC-MCNC: NORMAL MG/DL (ref 1–30)

## 2021-03-01 PROCEDURE — 1111F DSCHRG MED/CURRENT MED MERGE: CPT | Performed by: FAMILY MEDICINE

## 2021-03-01 PROCEDURE — 4004F PT TOBACCO SCREEN RCVD TLK: CPT | Performed by: FAMILY MEDICINE

## 2021-03-01 PROCEDURE — G8427 DOCREV CUR MEDS BY ELIG CLIN: HCPCS | Performed by: FAMILY MEDICINE

## 2021-03-01 PROCEDURE — 3046F HEMOGLOBIN A1C LEVEL >9.0%: CPT | Performed by: FAMILY MEDICINE

## 2021-03-01 PROCEDURE — G8417 CALC BMI ABV UP PARAM F/U: HCPCS | Performed by: FAMILY MEDICINE

## 2021-03-01 PROCEDURE — 99214 OFFICE O/P EST MOD 30 MIN: CPT | Performed by: FAMILY MEDICINE

## 2021-03-01 PROCEDURE — G8484 FLU IMMUNIZE NO ADMIN: HCPCS | Performed by: FAMILY MEDICINE

## 2021-03-01 PROCEDURE — G8926 SPIRO NO PERF OR DOC: HCPCS | Performed by: FAMILY MEDICINE

## 2021-03-01 PROCEDURE — 2022F DILAT RTA XM EVC RTNOPTHY: CPT | Performed by: FAMILY MEDICINE

## 2021-03-01 PROCEDURE — 3023F SPIROM DOC REV: CPT | Performed by: FAMILY MEDICINE

## 2021-03-01 RX ORDER — ARIPIPRAZOLE 5 MG/1
5 TABLET ORAL DAILY
Qty: 90 TABLET | Refills: 0 | Status: SHIPPED | OUTPATIENT
Start: 2021-03-01

## 2021-03-01 ASSESSMENT — COLUMBIA-SUICIDE SEVERITY RATING SCALE - C-SSRS
3. HAVE YOU BEEN THINKING ABOUT HOW YOU MIGHT KILL YOURSELF?: NO
4. HAVE YOU HAD THESE THOUGHTS AND HAD SOME INTENTION OF ACTING ON THEM?: NO
6. HAVE YOU EVER DONE ANYTHING, STARTED TO DO ANYTHING, OR PREPARED TO DO ANYTHING TO END YOUR LIFE?: YES
5. HAVE YOU STARTED TO WORK OUT OR WORKED OUT THE DETAILS OF HOW TO KILL YOURSELF? DO YOU INTEND TO CARRY OUT THIS PLAN?: NO

## 2021-03-01 ASSESSMENT — PATIENT HEALTH QUESTIONNAIRE - PHQ9
SUM OF ALL RESPONSES TO PHQ QUESTIONS 1-9: 12
5. POOR APPETITE OR OVEREATING: 1
SUM OF ALL RESPONSES TO PHQ QUESTIONS 1-9: 11
7. TROUBLE CONCENTRATING ON THINGS, SUCH AS READING THE NEWSPAPER OR WATCHING TELEVISION: 1
SUM OF ALL RESPONSES TO PHQ QUESTIONS 1-9: 12
1. LITTLE INTEREST OR PLEASURE IN DOING THINGS: 1
2. FEELING DOWN, DEPRESSED OR HOPELESS: 3
10. IF YOU CHECKED OFF ANY PROBLEMS, HOW DIFFICULT HAVE THESE PROBLEMS MADE IT FOR YOU TO DO YOUR WORK, TAKE CARE OF THINGS AT HOME, OR GET ALONG WITH OTHER PEOPLE: 3
6. FEELING BAD ABOUT YOURSELF - OR THAT YOU ARE A FAILURE OR HAVE LET YOURSELF OR YOUR FAMILY DOWN: 1

## 2021-03-01 ASSESSMENT — ENCOUNTER SYMPTOMS
ALLERGIC/IMMUNOLOGIC NEGATIVE: 1
GASTROINTESTINAL NEGATIVE: 1
EYES NEGATIVE: 1
RESPIRATORY NEGATIVE: 1

## 2021-03-01 NOTE — PROGRESS NOTES
Subjective:      Patient ID: Monserrat Manning is a 47 y.o. male. Diabetes  He presents for his follow-up diabetic visit. He has type 2 diabetes mellitus. His disease course has been fluctuating. There are no hypoglycemic associated symptoms. There are no diabetic associated symptoms. There are no hypoglycemic complications. Symptoms are stable. There are no diabetic complications. Risk factors for coronary artery disease include diabetes mellitus, dyslipidemia, male sex, hypertension, stress and sedentary lifestyle. Current diabetic treatment includes oral agent (dual therapy). He is compliant with treatment some of the time. His weight is fluctuating minimally. He is following a high fat/cholesterol, high salt and generally healthy diet. Meal planning includes ADA exchanges, avoidance of concentrated sweets, calorie counting and carbohydrate counting. He participates in exercise intermittently. Home blood sugar record trend: He did not bring Accu-Chek log. An ACE inhibitor/angiotensin II receptor blocker is being taken. Review of Systems   Constitutional: Negative. HENT: Negative. Eyes: Negative. Respiratory: Negative. Cardiovascular: Negative. Gastrointestinal: Negative. Endocrine: Negative. Musculoskeletal: Positive for arthralgias. Skin: Negative. Allergic/Immunologic: Negative. Neurological: Negative. Hematological: Negative. Psychiatric/Behavioral: Positive for agitation and behavioral problems. Past family and social history unremarkable. Diagnosis Orders   1. Uncontrolled type 2 diabetes mellitus with hyperglycemia (HCC)  Hemoglobin A1C    Lipid Panel    Microalbumin, Ur    TSH without Reflex   2. Smoker     3. Obesity (BMI 30-39.9)     4. Chronic obstructive pulmonary disease, unspecified COPD type ([address]     5. Essential hypertension     6. Mixed hyperlipidemia  Hemoglobin A1C    Lipid Panel    Microalbumin, Ur    TSH without Reflex   7.  Mood disorder (HCC)  CA DISCHARGE MEDS RECONCILED W/ CURRENT OUTPATIENT MED LIST    Urine Drug Screen    HIV Rapid 1&2    Hepatitis Panel, Acute   8. Chronic pain of right knee  Imani Hagan DO, Orthopedic Surgery, [facility]         Objective:   Physical Exam  Vitals signs and nursing note reviewed. Constitutional:       Appearance: He is well-developed. HENT:      Head: Normocephalic and atraumatic. Right Ear: External ear normal.      Left Ear: External ear normal.      Nose: Nose normal.   Eyes:      Conjunctiva/sclera: Conjunctivae normal.      Pupils: Pupils are equal, round, and reactive to light. Neck:      Musculoskeletal: Normal range of motion and neck supple. Cardiovascular:      Rate and Rhythm: Normal rate and regular rhythm. Heart sounds: Normal heart sounds. Comments: Hypertension, hyperlipidemia  Uncontrolled insulin-dependent diabetes mellitus  Pulmonary:      Effort: Pulmonary effort is normal.      Breath sounds: Normal breath sounds. Comments: COPD  Abdominal:      General: Bowel sounds are normal.      Palpations: Abdomen is soft. Musculoskeletal: Normal range of motion. Comments: Chronic right knee pain. Clinical examination is benign   Skin:     General: Skin is warm and dry. Neurological:      Mental Status: He is alert and oriented to person, place, and time. Deep Tendon Reflexes: Reflexes are normal and symmetric. Psychiatric:      Comments: Mood disorder with agitation  History of suicidality. He was recently seen in the emergency room. Marijuana use  Noncompliance with medications         Assessment:       Diagnosis Orders   1. Uncontrolled type 2 diabetes mellitus with hyperglycemia (HCC)  Hemoglobin A1C    Lipid Panel    Microalbumin, Ur    TSH without Reflex   2. Smoker     3. Obesity (BMI 30-39.9)     4. Chronic obstructive pulmonary disease, unspecified COPD type ([address]     5. Essential hypertension     6.  Mixed hyperlipidemia  Hemoglobin A1C Lipid Panel    Microalbumin, Ur    TSH without Reflex   7. Mood disorder ([address]  [place] DISCHARGE MEDS RECONCILED W/ CURRENT OUTPATIENT MED LIST    Urine Drug Screen    HIV Rapid 1&2    Hepatitis Panel, Acute   8. 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 Patient states that he recently went to the emergency room because of mental health breakdown with feeling of suicidality-does not continue at this time. He was discharged on Xanax that he has been tolerating well. After long discussion, he agrees to be placed on Abilify pending establishing with mental health service. May go to the urgent care as necessary. Marijuana use  He denies excessive alcohol  Uncontrolled diabetes mellitus with A1c of 9 as of 9/2020. He states that he gets his medication from [facility]. When he was seen last month in addition to Metformin he was placed on Januvia that he did not continue. Compliance is advised. He is advised to keep Accu-Chek log for office review, adhere to ADA 1800 diet, daily moderate exercise and lifestyle change. We will recheck his A1c  Hypertension. It appears that he has not been taking his ACE inhibitor. We aim to keep his blood pressure equal less than 130/80. Medication refills provided, keep daily blood pressure log for office review, low-fat high-fiber diet, stress reduction with marijuana and optimize diabetes  Hyperlipidemia. Compliant with Metformin  Compliant with daily aspirin  COPD. He continues to smoke despite advice. Once again he is counseled, quit date, alternative to consider.   He is advised to comply with Trelegy  Med list reviewed, refills provided per request  Further recommendations to follow labs  Call for any concern  This note is created with a voice recognition program and while intend to generate a document that accurately reflects the content of the visit, no guarantee can be provided that every mistake has been identified and corrected by editing.           Ashley Rand MD

## 2021-03-05 DIAGNOSIS — M25.561 RIGHT KNEE PAIN, UNSPECIFIED CHRONICITY: Primary | ICD-10-CM

## 2021-03-10 ENCOUNTER — OFFICE VISIT (OUTPATIENT)
Dept: ORTHOPEDIC SURGERY | Age: 41
End: 2021-03-10
Payer: COMMERCIAL

## 2021-03-10 VITALS — BODY MASS INDEX: 32.64 KG/M2 | TEMPERATURE: 98 F | HEIGHT: 70 IN | WEIGHT: 228 LBS

## 2021-03-10 DIAGNOSIS — M22.41 CHONDROMALACIA PATELLAE OF RIGHT KNEE: Primary | ICD-10-CM

## 2021-03-10 DIAGNOSIS — M25.561 RIGHT KNEE PAIN, UNSPECIFIED CHRONICITY: ICD-10-CM

## 2021-03-10 PROCEDURE — G8484 FLU IMMUNIZE NO ADMIN: HCPCS | Performed by: ORTHOPAEDIC SURGERY

## 2021-03-10 PROCEDURE — G8427 DOCREV CUR MEDS BY ELIG CLIN: HCPCS | Performed by: ORTHOPAEDIC SURGERY

## 2021-03-10 PROCEDURE — 99244 OFF/OP CNSLTJ NEW/EST MOD 40: CPT | Performed by: ORTHOPAEDIC SURGERY

## 2021-03-10 PROCEDURE — G8417 CALC BMI ABV UP PARAM F/U: HCPCS | Performed by: ORTHOPAEDIC SURGERY

## 2021-03-10 ASSESSMENT — ENCOUNTER SYMPTOMS
COUGH: 0
NAUSEA: 0
CONSTIPATION: 0
DIARRHEA: 0

## 2021-03-10 NOTE — LETTER
Dr. Marcel Gilman  [address] [facility]  [address] [address]  Dept: [phone]  Dept Fax: [phone]        3/12/21    Patient: Pa Obrien  YOB: 1980    Dear Abi Acosta MD,    I had the pleasure of seeing one of your patients, Lori Gold today in the office. Below are the relevant portions of my assessment and plan of care. IMPRESSION:  1. Chondromalacia patellae of right knee    2. Right knee pain, unspecified chronicity      PLAN:  Discussed etiology and natural history of right knee chondromalacia patella/right knee pain. The treatment options may include oral anti-inflammatories, bracing, injections, advanced imaging, activity modification, physical therapy and/or surgical intervention. The patient would like to proceed with physical therapy for his right knee, bilateral hips and lower back. The patient will follow up in 8 weeks. We discussed that the patient should call us with any concerns or questions. Thank you for allowing me to participate in the care of this patient. I will keep you updated on this patient's follow up and I look forward to serving you and your patients again in the future. Please don't hesitate to contact me at my mobile number [phone].         Heidi Ross

## 2021-03-10 NOTE — PROGRESS NOTES
[facility]  [address] [facility]  [address] [address]  Dept: [phone]    Ambulatory Orthopedic Consult      CHIEF COMPLAINT:    Chief Complaint   Patient presents with    Knee Pain     right       HISTORY OF PRESENT ILLNESS:      The patient is a 47 y.o. male who is being seen at the request of  Jo Garcia MD for consultation and evaluation of  Right knee pain. Ellyn Jane was in [employer] and says he feels he has wear and tear from being active and a lot of running/jumping. Patient states most of his right knee pain is on his medial side. 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 Patient does feel his right knee giving out. He hasn't fallen due to the buckling but feels when his right knee gives out that he could fall. The interventions that he has tried has been OTC pain medication and stretching with no improvement. Past Medical History:    No past medical history on file. Past Surgical History:    Past Surgical History:   Procedure Laterality Date    PATELLAR TENDON REPAIR         Current Medications:   Current Outpatient Medications   Medication Sig Dispense Refill    ARIPiprazole (ABILIFY) 5 MG tablet Take 1 tablet by mouth daily 90 tablet 0    ALPRAZolam (XANAX) 0.5 MG tablet Take 1 tablet by mouth nightly as needed for Anxiety for up to 30 days. Do not drive your truck or other vehicles/heavy machinery on this medication. Do not take alcohol while taking this medication.  10 tablet 0    metFORMIN (GLUCOPHAGE) 1000 MG tablet TAKE 1 TABLET BY MOUTH TWICE A DAY WITH MEALS 180 tablet 0    atorvastatin (LIPITOR) 40 MG tablet TAKE 1 TABLET BY MOUTH EVERY DAY 20 tablet 0    lisinopril (PRINIVIL;ZESTRIL) 5 MG tablet Take 1 tablet by mouth daily 90 tablet 1    aspirin EC 81 MG EC tablet Take 1 tablet by mouth daily 200 tablet 1    SITagliptin (JANUVIA) 100 MG tablet Take 1 tablet by mouth daily 90 tablet 1    Blood Glucose Monitoring Suppl (ACURA BLOOD GLUCOSE METER) w/Device KIT Test 2-3 times a day 1 kit 0    blood glucose monitor strips Test 3 times a day 90 strip 5    Lancets MISC 1 each by Does not apply route 3 times daily 90 each 5    fluticasone-umeclidin-vilant (TRELEGY ELLIPTA) 100-62.5-25 MCG/INH AEPB Inhale 1 puff into the lungs daily 1 each 0    Spacer/Aero-Holding Chambers (E-Z SPACER) ZULAY 1 Device by Does not apply route daily as needed (sob) 1 Device 0     No current facility-administered medications for this visit.         Allergies:    Metformin    Social History:   Social History     Socioeconomic History    Marital status:      Spouse name: Not on file    Number of children: Not on file    Years of education: Not on file    Highest education level: Not on file   Occupational History    Not on file   Social Needs    Financial resource strain: Not on file    Food insecurity     Worry: Not on file     Inability: Not on file    Transportation needs     Medical: Not on file     Non-medical: Not on file   Tobacco Use    Smoking status: Current Every Day Smoker     Packs/day: 1.00     Years: 19.00     Pack years: 19.00     Types: Cigarettes     Start date: 9/21/2001    Smokeless tobacco: Never Used   Substance and Sexual Activity    Alcohol use: Never     Frequency: Never    Drug use: Never    Sexual activity: Yes   Lifestyle    Physical activity     Days per week: Not on file     Minutes per session: Not on file    Stress: Not on file   Relationships    Social connections     Talks on phone: Not on file     Gets together: Not on file     Attends Church service: Not on file     Active member of club or organization: Not on file     Attends meetings of clubs or organizations: Not on file     Relationship status: Not on file    Intimate partner violence     Fear of current or ex partner: Not on file     Emotionally abused: Not on file Physically abused: Not on file     Forced sexual activity: Not on file   Other Topics Concern    Not on file   Social History Narrative    Not on file       Family History:  No family history on file. REVIEW OF SYSTEMS:  Review of Systems   Constitutional: Negative for chills and fever. Respiratory: Negative for cough. Gastrointestinal: Negative for constipation, diarrhea and nausea. Musculoskeletal: Positive for arthralgias (right knee). Negative for gait problem, joint swelling and myalgias. Neurological: Negative for dizziness, weakness and numbness. I have reviewed the CC, HPI, ROS, PMH, FHX, Social History, and if not present in this note, I have reviewed in the patient's chart. I agree with the documentation provided by other staff and have reviewed their documentation prior to providing my signature indicating agreement. PHYSICAL EXAM:  Temp 98 °F (36.7 °C)   Ht 5' 10\" (1.778 m)   Wt 228 lb (103.4 kg)   BMI 32.71 kg/m²  Body mass index is 32.71 kg/m². Physical Exam  Gen: alert and oriented to person and place. Psych:  Appropriate affect; Appropriate knowledge base; Appropriate mood; No hallucinations; Head: normocephalic, atraumatic   Chest: symmetric chest excursion; nonlabored respiratory effort. Pelvis: stable; no obvious pelvis deformity  Ortho Exam  Extremity: Patient displays a mild shortened weightbearing phase to the Right lower extremity. Mild knee effusion is noted to the Right lower extremity. There is no erythema, warmth, skin lesions, signs of infection. Positive Patellar crepitance is noted on the. Negative J sign is noted. Negative Patellar apprehension is noted. PositiveLateral patellar compression test is noted. There is no instability with varus and valgus stress applied at 0 and 30° of flexion. There is negative anterior drawer Lachman's test.  Negative Steff's testing is appreciated. There is negative hip log roll and Stinchfield test noted. Full range of motion of the ankle is noted. Motor, sensory, vascular examination to the lower extremity is appreciated. Radiology:     Xr Knee Right (min 4 Views)    Result Date: 3/11/2021  History: Right knee pain status post patellar tendon repair remotely Findings: Standing AP, lateral, merchant, tunnel view x-rays of the right knee done the office today shows mild degenerative changes with mild periarticular osteophytosis. Sequela of patellar tendon reconstruction or repair visualized in the patella and the merchant view is noted. No further evidence of fracture, subluxation, dislocation, radiopaque foreign body, radiopaque tumors noted. Impression: Right knee mild degenerative changes as described above. ASSESSMENT:     1. Chondromalacia patellae of right knee    2. Right knee pain, unspecified chronicity         PLAN:     Discussed etiology and natural history of right knee chondromalacia patella/right knee pain. The treatment options may include oral anti-inflammatories, bracing, injections, advanced imaging, activity modification, physical therapy and/or surgical intervention. The patient would like to proceed with physical therapy for his right knee, bilateral hips and lower back. The patient will follow up in 8 weeks. We discussed that the patient should call us with any concerns or questions. Return in about 8 weeks (around 5/5/2021) for re- evaluation. No orders of the defined types were placed in this encounter.       Orders Placed This Encounter   Procedures   [facility] Physical Therapy [facility]     Referral Priority:   Routine     Referral Type:   Eval and Treat     Referral Reason:   Specialty Services Required     Requested Specialty:   Physical Therapy     Number of Visits Requested:   1     I, Patrick Fraga RN am scribing for and in the presence of Dr. Theresa Perales  3/12/2021 9:22 AM      I have reviewed and made changes accordingly to the work scribed by Aura Nelson Fabricio Bustamante RN. The documentation accurately reflects work and decisions made by me. I have also reviewed documentation completed by clinical staff.     Patel Goodrich DO, [facility]  3/12/2021 9:22 AM    This note is created with the assistance of a speech recognition program.  While intending to generate a document that actually reflects the content of the visit, the document can still have some errors including those of syntax and sound a like substitutions which may escape proof reading.  In such instances, actual meaning can be extrapolated by contextual diversion      Electronically signed by Ashwin Joshua on 3/12/2021 at 9:22 AM

## 2021-03-13 ENCOUNTER — HOSPITAL ENCOUNTER (EMERGENCY)
Age: 41
Discharge: HOME | End: 2021-03-13
Attending: EMERGENCY MEDICINE
Payer: COMMERCIAL

## 2021-03-13 VITALS
TEMPERATURE: 98.9 F | HEART RATE: 101 BPM | RESPIRATION RATE: 14 BRPM | HEIGHT: 70 IN | DIASTOLIC BLOOD PRESSURE: 93 MMHG | BODY MASS INDEX: 32.07 KG/M2 | SYSTOLIC BLOOD PRESSURE: 158 MMHG | OXYGEN SATURATION: 97 % | WEIGHT: 224 LBS

## 2021-03-13 DIAGNOSIS — Z13.9 ENCOUNTER FOR MEDICAL SCREENING EXAMINATION: Primary | ICD-10-CM

## 2021-03-13 LAB
GLUCOSE BLD-MCNC: 177 MG/DL (ref 75–110)
TSH SERPL DL<=0.05 MIU/L-ACNC: 3 MIU/L (ref 0.3–5)

## 2021-03-13 PROCEDURE — 84443 ASSAY THYROID STIM HORMONE: CPT

## 2021-03-13 PROCEDURE — 99283 EMERGENCY DEPT VISIT LOW MDM: CPT

## 2021-03-13 PROCEDURE — 82947 ASSAY GLUCOSE BLOOD QUANT: CPT

## 2021-03-13 RX ORDER — SERTRALINE HYDROCHLORIDE 25 MG/1
25 TABLET, FILM COATED ORAL DAILY
COMMUNITY

## 2021-03-14 NOTE — ED PROVIDER NOTES
EMERGENCY DEPARTMENT ENCOUNTER    Pt Name: Ellyn Jane  MRN: [id number]  [place] 1980  Date of evaluation: 3/13/21  CHIEF COMPLAINT       Chief Complaint   Patient presents with    Blurred Vision     HISTORY OF PRESENT ILLNESS   Patient is a 60-year-old male with PMH of non-insulin-dependent diabetes, PTSD, , who presents to the ED for management of shaking spells. Patient thought he was having a hypoglycemic event today with diaphoresis and shaking. This is in the setting of not eating well over the past few days because of lack of appetite. In the ED his glucose check was 177. He is unclear why he lost his appetite. No nausea, vomiting, or abdominal pain. He reports skipping breakfast with the past few days. I evaluated him in the ED approximately 1 month ago and diagnosed him with anxiety and prescribed him a short course of Xanax which helped for the short-term. He describes loss of sleep, loss of appetite, disinterest in his hobbies as well periods of time when he does not sleep at all. No other complaints at this time. No fever, cough, shortness of breath, chest pain, changes in urine or stool. Patient had Zoom meeting today with primary care physician who started him on medications for mood control. His PCP started him on Abilify 2 weeks ago. His new therapist today started him on Zoloft. He denies SI, HI, hallucinations. REVIEW OF SYSTEMS     Review of Systems   All other systems reviewed and are negative. PASTMEDICAL HISTORY     Past Medical History:   Diagnosis Date    Diabetes mellitus ([address]      SURGICAL HISTORY       Past Surgical History:   Procedure Laterality Date    PATELLAR TENDON REPAIR       CURRENT MEDICATIONS       Previous Medications    ALPRAZOLAM (XANAX) 0.5 MG TABLET    Take 1 tablet by mouth nightly as needed for Anxiety for up to 30 days. Do not drive your truck or other vehicles/heavy machinery on this medication.   Do not take alcohol while taking this medication. ARIPIPRAZOLE (ABILIFY) 5 MG TABLET    Take 1 tablet by mouth daily    ASPIRIN EC 81 MG EC TABLET    Take 1 tablet by mouth daily    ATORVASTATIN (LIPITOR) 40 MG TABLET    TAKE 1 TABLET BY MOUTH EVERY DAY    BLOOD GLUCOSE MONITOR STRIPS    Test 3 times a day    BLOOD GLUCOSE MONITORING SUPPL (ACURA BLOOD GLUCOSE METER) W/DEVICE KIT    Test 2-3 times a day    FLUTICASONE-UMECLIDIN-VILANT (TRELEGY ELLIPTA) 100-62.5-25 MCG/INH AEPB    Inhale 1 puff into the lungs daily    LANCETS MISC    1 each by Does not apply route 3 times daily    LISINOPRIL (PRINIVIL;ZESTRIL) 5 MG TABLET    Take 1 tablet by mouth daily    METFORMIN (GLUCOPHAGE) 1000 MG TABLET    TAKE 1 TABLET BY MOUTH TWICE A DAY WITH MEALS    SERTRALINE (ZOLOFT) 25 MG TABLET    Take 25 mg by mouth daily    SITAGLIPTIN (JANUVIA) 100 MG TABLET    Take 1 tablet by mouth daily    SPACER/AERO-HOLDING CHAMBERS (E-Z SPACER) ZULAY    1 Device by Does not apply route daily as needed (sob)     ALLERGIES     is allergic to metformin. FAMILY HISTORY     He indicated that his mother is alive. He indicated that his father is alive. SOCIAL HISTORY       Social History     Tobacco Use    Smoking status: Current Every Day Smoker     Packs/day: 1.00     Years: 19.00     Pack years: 19.00     Types: Cigarettes     Start date: 9/21/2001    Smokeless tobacco: Never Used   Substance Use Topics    Alcohol use: Never     Frequency: Never    Drug use: Never     PHYSICAL EXAM     INITIAL VITALS: BP (!) 158/93   Pulse 101   Temp 98.9 °F (37.2 °C) (Oral)   Resp 14   Ht 5' 10\" (1.778 m)   Wt 224 lb (101.6 kg)   SpO2 97%   BMI 32.14 kg/m²    Physical Exam  HENT:      Head: Normocephalic. Right Ear: External ear normal.      Left Ear: External ear normal.      Nose: Nose normal.   Eyes:      Conjunctiva/sclera: Conjunctivae normal.   Cardiovascular:      Rate and Rhythm: Normal rate.    Pulmonary:      Effort: Pulmonary effort is normal.

## 2021-03-18 ENCOUNTER — HOSPITAL ENCOUNTER (OUTPATIENT)
Dept: PHYSICAL THERAPY | Facility: CLINIC | Age: 41
Setting detail: THERAPIES SERIES
Discharge: HOME | End: 2021-03-18
Payer: COMMERCIAL

## 2021-03-18 NOTE — CONSULTS
[] [facility] ([facility]) [facility] &  Therapy  [address]    [phone]  F: [phone]   [] [address]  [facility] [address]   [address]  P: [phone]  F: [phone]  [] [address] &  Therapy  [address]  P: [phone]  F: [phone] [] [address]  P: [phone]  F: [phone]  [x] [address]  [address] [address] [facility] [address]   [address]   [place]: [phone]  F: [phone]   [] [place] [address] [facility] [address]  [place]: [phone]   F: [phone]     Physical Therapy Cancel/No Show note    Date: 3/18/2021  Patient: Cynthia Koehler  : 1980  MRN: [id number]    Cancels/No Shows to date: 1    For today's appointment patient:    [x]  Cancelled    [] Rescheduled appointment    [] No-show     Reason given by patient:    []  Patient ill    [x]  Conflicting appointment    [] No transportation      [] Conflict with work    [] No reason given    [] Weather related    [] [id number]    [] Other:      Comments:        [x] Next appointment was confirmed - rescheduled    Electronically signed by: Jane Rinaldi, SPT

## 2021-03-22 RX ORDER — ATORVASTATIN CALCIUM 40 MG/1
TABLET, FILM COATED ORAL
Qty: 90 TABLET | Refills: 1 | Status: SHIPPED | OUTPATIENT
Start: 2021-03-22

## 2021-03-25 ENCOUNTER — HOSPITAL ENCOUNTER (OUTPATIENT)
Dept: PHYSICAL THERAPY | Facility: CLINIC | Age: 41
Setting detail: THERAPIES SERIES
Discharge: HOME | End: 2021-03-25
Payer: COMMERCIAL

## 2021-03-25 PROCEDURE — 97110 THERAPEUTIC EXERCISES: CPT

## 2021-03-25 PROCEDURE — 97161 PT EVAL LOW COMPLEX 20 MIN: CPT

## 2021-03-25 NOTE — CONSULTS
[x] [facility]  Outpatient Rehabilitation &  Therapy  [facility]   [place]: [phone]  F: [phone]      Physical Therapy Lower Extremity Evaluation    Date:  3/25/2021  Patient: Carolina Waldron  : 1980  MRN: [id number]  Physician: Dr. Liriano Petit: Kolton Gambino (no copay, met deductible)  Medical Diagnosis: R knee pain Rehab Codes: M25.561  Onset date:    Next Dr's appt. : 21    Subjective:   CC/HPI: 46 y/o female presents to PT clinic with c/o R medial knee pain that has been ongoing since 2016. Patient reports that his L knee starting irritating him shortly after. Patient reports decreased LE flexibility and overall stiffness to his hips and low back. Patient reports that he would like to get back to running. He reports that he gets cramps to his R calf. Patient is being followed by the [place] for \"other issues\" that patient did not disclose. PMHx: [] Unremarkable [x] Diabetes [] HTN  [] Pacemaker   [] MI/Heart Problems [] Cancer [] Arthritis   [] Other:              [x] Refer to full medical chart  In EPIC       Past Surgical History         Laterality Last Occurrence Comments   Patellar tendon repair [[id number]]             Tests: [x] X-Ray:    Xr Knee Right (min 4 Views)     Result Date: 3/11/2021   History: Right knee pain status post patellar tendon repair remotely Findings: Standing AP, lateral, merchant, tunnel view x-rays of the right knee done the office today shows mild degenerative changes with mild periarticular osteophytosis. Sequela of patellar tendon reconstruction or repair visualized in the patella and the merchant view is noted. No further evidence of fracture, subluxation, dislocation, radiopaque foreign body, radiopaque tumors noted. Impression: Right knee mild degenerative changes as described above.       [] MRI:    [] Other:     Comorbidities:   [x] Obesity [] Dialysis  [] N/A   [] Asthma/COPD [] Dementia [] Other:   [] Stroke [] Sleep apnea [] Other:   [] Vascular disease [] Rheumatic disease [] Other:       Medications:  [x] Refer to full medical record [] None [] Other:  Allergies:       [x] Refer to full medical record [] None [] Other:    ADL/IADL Previous level of function Current level of function Who currently assists the patient with task   Bathing  [] Independent  [] Assist [x] Independent  [] Assist    Dress/grooming [] Independent  [] Assist [x] Independent  [] Assist    Transfer/mobility [] Independent  [] Assist [x] Independent  [] Assist    Feeding [] Independent  [] Assist [x] Independent  [] Assist    Toileting [] Independent  [] Assist [x] Independent  [] Assist    Driving [] Independent  [] Assist [x] Independent  [] Assist    Housekeeping [] Independent  [] Assist [x] Independent  [] Assist    Grocery shop/meal prep [] Independent  [] Assist [x] Independent  [] Assist      Gait Prior level of function Current level of function    [] Independent  [] Assist [x] Independent  [] Assist   Device: [] Independent [x] Independent    [] Straight Cane [] Quad cane [] Straight Cane [] Quad cane    [] Standard walker [] Rolling walker   [] 4 wheeled walker [] Standard walker [] Rolling walker   [] 4 wheeled walker    [] Wheelchair [] Wheelchair       Marital Status     Home type 1st story- with basement    Stairs inside 0    [employer]     Job status Has been off for 12 weeks - not due to knee/hip pain   Work Activities/duties  Prolonged sitting   Recreational Activities Running, [employer]        Pain present?  Yes    Location R knee    Pain Rating currently 4/10    Pain at worse 6/10   Pain at best 2/10    Description of pain L knee- instability  R knee- aching, dull pain to the anterior knee    Altered Sensation Mild numbness to the R lateral knee   What makes it worse Kneeling    What makes it better Ice, elevation, massage gun   Symptom progression Stable   Sleep Poor secondary to position as well as PTSD              Objective:    ROM  ° A/P STRENGTH    Left Right Left Right   Hip Flex WFL WFL 4- 4-   Ext ~75% ~75% 4- 4-   ER WFL WFL     IR WFL WFL     ABD   4- 4-   Knee Flex WFL WFL 5 4   Ext WFL WFL 5 5   Ankle DF -2/5 -2/5 5 5   PF   5 5       TESTS (+/-) Left Right Not Tested   Hip Scouring - - []   DARNELLs - - []   Piriformis + - []   Pat-Fem Grind - - []       OBSERVATION No Deficit Deficit Not Tested Comments   Posture       Rounded Shoulders [] [x] []    PSIS [x] [] []    ASIS [x] [] []    Genu Valgus [x] [] []    Genu Varus [x] [] []    Genu Recurvatum [x] [] []    Slumped Sitting [] [x] []    Palpation [x] [] []    Sensation [] [x] [] Decreased sensation to the R lateral knee compared to the medial knee    Edema [x] [] []    Neurological [x] [] []    Patellar Mobility [x] [] []    Patellar Orientation [x] [] []    Gait [] [x] [] Analysis:   RLE adduction during swing causing decreased ISABEL         FUNCTION Normal Difficult Unable   Sitting [x] [] []   Standing [] [x] []   Ambulation [] [x] []   Groom/Dress [x] [] []   Lift/Carry [x] [] []   Stairs [] [x] []   Bending [] [x] []   Squat [] [x] []   Kneel [] [x] []        BALANCE/PROPRIOCEPTION              [] Not tested   Single leg stance       R                     L                                PAIN   Eyes open                     15        Sec. 15      Sec            . []    Eyes closed                          Sec. Sec                  . []          FUNCTIONAL TESTS PAIN NO PAIN COMMENTS    [] []    6 lateral Tap down [x] [] Knee instability bilaterally   Pain R>L    Squat [x] [] To R knee    Single Leg Squat  [x] [] With RLE            Flexibility Normal Left tight Right tight   Hip flexor [x] [] []   quad [] [x] [x]   HS [] [] [x]   piriformis [] [x] []   gastroc [] [x] [x]   Soleus  [] [] []    [] [] []    [] [] []         Somatic Dysfunctions Normal Deficit Details   Lumbar [x] []    SI   [x] []        Functional Test: Lower Extremity Functional Scale (LEFS)   Score: 60% functionally impaired       Comments: Active individual with decreased functional strength to his hips bilaterally R>L. Patient has decreased mobility that was addressed with stretching this date. Patient having fatigue with <10 repetitions of bridging, clamshells, and SL hip abduction. Pain has no tenderness or increase in pain during the session. Assessment:    Patient would benefit from skilled physical therapy services in order to: improve bilateral LE flexibility and strength to ease ADL's and prepare the patient for recreational running     Problems:    [x] ? Pain:  [x] ? ROM:  [x] ? Strength:    STG: (to be met in 10 treatments)  1. ? Pain: Decrease pain levels to <2/10 with ADL's   2. ? ROM: Increase flexibility and AROM limitations throughout to equal bilat to reduce difficulty with ADLs  3. ? Strength: Increase bilateral hip strength to 5/5 to ease squatting and stair navigation   4. Independent with Home Exercise Programs    LTG: (to be met in 20 treatments)  1. Improve score on LEFS assessment tool from 60% impairment to <25% impairment   2. Patient to demonstrate lateral tap down with appropriate technique and no pain   3. Reduce pain levels to 0/10 with running                    Patient goals: knee flexibility and mobility     Rehab Potential:  [x] Good  [] Fair  [] Poor   Suggested Professional Referral:  [x] No  [] Yes:  Barriers to Goal Achievement[de-identified]  [x] No  [] Yes:  Domestic Concerns:  [x] No  [] Yes:    Pt. Education:  [x] Plans/Goals, Risks/Benefits discussed  [x] Home exercise program    Patient provided with multiple LE stretches as well as hip strengthening exercises. Patient demonstrates them in the clinic with intermittent cueing to improve technique. Patient instructed to take rests breaks as needed, since he demonstrates fatigue in the clinic this date.  Discussed the anatomy of the lower leg and that the pain he is contributing to his \"Achilles\" is likely from the gastrocnemius as these structures are connected. Patient had no questions at the end of session. Method of Education: [x] Verbal  [x] Demo  [x] Written  Comprehension of Education:  [x] Verbalizes understanding. [x] Demonstrates understanding. [x] Needs Review. [] Demonstrates/verbalizes understanding of HEP/Ed previously given. Treatment Plan:  [x] Therapeutic Exercise    [] Aquatic Therapy   [x] Manual Therapy     [] Electrical Stimulation  [x] Instruction in HEP      [] Lumbar/Cervical Traction  [x] Neuromuscular Re-education [] Cold/hotpack  [] Iontophoresis: 4 mg/mL  [x] Vasocompression (GameReady)                    Dexamethasone Sodium  [] Gait Training             Phosphate 40-80 mAmin         []  Medication allergies reviewed for use of    Dexamethasone Sodium Phosphate 4mg/ml     with iontophoresis treatments. Pt is not allergic.     Frequency:  2 x/week for 20 visits    Todays Treatment:    Exercises:  Exercise    R knee Reps/ Time Weight/ Level Comments         Bike             *Step calf S  3x30\"     *Stool HS S  3x30\"            Calf Inversion S       *Bridges  x10     *Clamshells  x10     *Sidelying hip ABD  x10     *Hip ER stretch  3x30\"            4 way hip Tband       Total Gym Squats       Total Gym HR       Lunges/Reverse Lunges      SLS Rebounder             Other:     Specific Instructions for next treatment: Progress hip strength and LE stability     Evaluation Complexity:  History (Personal factors, comorbidities) [x] 0 [] 1-2 [] 3+   Exam (limitations, restrictions) [x] 1-2 [] 3 [] 4+   Clinical presentation (progression) [x] Stable [] Evolving  [] Unstable   Decision Making [x] Low [] Moderate [] High    [x] Low Complexity [] Moderate Complexity [] High Complexity       Treatment Charges: Mins Units   [x] Evaluation       [x]  Low       []  Moderate       []  High 25 1   []  Modalities     [x]  Ther Exercise 15 1   []  Manual Therapy     []  Ther Activities     []  Aquatics     []  Vasocompression     []  Other       TOTAL TREATMENT TIME: 40 minutes     Time ED:6318 Time Out:1630    Electronically signed by: JONO Mcclendon   This Documentation has been reviewed by [address], PT       Physician Signature:________________________________Date:__________________  By signing above or cosigning this note, I have reviewed this plan of care and certify a need for medically necessary rehabilitation services.      *PLEASE SIGN ABOVE AND FAX BACK ALL PAGES*

## 2021-04-06 ENCOUNTER — HOSPITAL ENCOUNTER (OUTPATIENT)
Dept: PHYSICAL THERAPY | Facility: CLINIC | Age: 41
Setting detail: THERAPIES SERIES
Discharge: HOME | End: 2021-04-06
Payer: COMMERCIAL

## 2021-04-06 NOTE — CONSULTS
[] [facility]) - [facility] &  Therapy  [address]    [phone]  F: [phone]   [] [address]  [facility]   [address]  P: [phone]  F: [phone]  [] [address] &  Therapy  [address]  P: [phone]  F: [phone] [] [address]  P: [phone]  F: [phone]  [x] [address]  [address] [address] [facility] [address]   [address]   [place]: [phone]  F: [phone]   [] [facility]  [address] [facility] [address]  [place]: [phone]   F: [phone]     Physical Therapy Cancel/No Show note    Date: 2021  Patient: Som Bender  : 1980  MRN: [id number]    Cancels/No Shows to date: 2    For today's appointment patient:    []  Cancelled    [] Rescheduled appointment    [x] No-show     Reason given by patient:    []  Patient ill    []  Conflicting appointment    [] No transportation      [] Conflict with work    [x] No reason given    [] Weather related    [] [id number]    [] Other:      Comments:          Electronically signed by: Gamal Fnues PT

## 2021-04-08 ENCOUNTER — HOSPITAL ENCOUNTER (OUTPATIENT)
Dept: PHYSICAL THERAPY | Facility: CLINIC | Age: 41
Setting detail: THERAPIES SERIES
Discharge: HOME | End: 2021-04-08
Payer: COMMERCIAL

## 2021-04-08 PROCEDURE — 97140 MANUAL THERAPY 1/> REGIONS: CPT

## 2021-04-08 PROCEDURE — 97110 THERAPEUTIC EXERCISES: CPT

## 2021-04-08 NOTE — FLOWSHEET NOTE
[x] [facility] &  Therapy  [facility]   [place]: [phone]  F: [phone]      Physical Therapy Daily Treatment Note    Date:  2021  Patient Name:  Moriah Winkler    :  1980  MRN: [id number]  Physician: Dr. Dennice Peabody (no copay, met deductible)  Medical Diagnosis: R knee pain       Rehab Codes: M25.561  Onset date:                                Next 's appt. : 21  Visit# / total visits:      Cancels/No Shows: 0    Subjective:    Pain:  [x] Yes  [] No Location: R knee   Pain Rating: (0-10 scale) /10  Pain altered Tx:  [x] No  [] Yes  Action:  Comments: Patient reports compliance with HEP having no questions on the exercises. Patient was on a spring break trip last week. Objective:  Precautions: Standard   Exercises:  Exercise     R knee Reps/ Time Weight/ Level Comments             Bike   10'                 SB calf S  3x30\"       Stool HS S  3x30\"               Balance Board  3' L2      Calf Inversion S   3x30\"       *Bridges  2x10       *Clamshells  2x10  Midway      Sidelying hip ABD         Hip ER stretch                   *4 way hip Tband   x10 Orange      TRX Squats  2x10        TRX SL Squats  x5    bilat    Forward Lunges x10        SLS Rebounder                    Other: MFR using [employer] to bilat gastroc       Specific Instructions for next treatment: Progress hip strength and LE stability       Treatment Charges: Mins Units   []  Modalities     [x]  Ther Exercise 45 3   [x]  Manual Therapy 10 1   []  Ther Activities     []  Aquatics     []  Vasocompression     []  Other     Total Treatment time 55 4       Assessment: [x] Progressing toward goals. Patient tolerated strength progressions this date with fatigue, but no increase in pain. Patient demonstrates knee valgus during single leg squats at the TRX with R>L. MFR to bilateral gastroc at end of session.      [] No change. [] Other:  [x] Patient would continue to benefit from skilled physical therapy services in order to: improve LE strength and mobility to decrease R knee pain and ease ADLs     STG/LTG   STG: (to be met in 10 treatments)  1. ? Pain: Decrease pain levels to <2/10 with ADL's   2. ? ROM: Increase flexibility and AROM limitations throughout to equal bilat to reduce difficulty with ADLs  3. ? Strength: Increase bilateral hip strength to 5/5 to ease squatting and stair navigation   4. Independent with Home Exercise Programs     LTG: (to be met in 20 treatments)  1. Improve score on LEFS assessment tool from 60% impairment to <25% impairment   2. Patient to demonstrate lateral tap down with appropriate technique and no pain   3. Reduce pain levels to 0/10 with running     Pt. Education:  [x] Yes  [] No  [] Reviewed Prior HEP/Ed  Educated patient on technique for new exercises this date. Patient provided with orange Tband for 4-way hip and clamshells. Stressed calf stretching after using the massage gun, which he reports having at home. Method of Education: [x] Verbal  [x] Demo  [x] Written  Comprehension of Education:  [x] Verbalizes understanding. [x] Demonstrates understanding. [x] Needs review. [] Demonstrates/verbalizes HEP/Ed previously given. Plan: [x] Continue current frequency toward long and short term goals.     [x] Specific Instructions for subsequent treatments: Progress hip strength and LE stability       Time In:1436            Time Out: 1540    Electronically signed by:  JONO Mathis   This Documentation has been reviewed by [address], PT

## 2021-04-13 ENCOUNTER — HOSPITAL ENCOUNTER (OUTPATIENT)
Dept: PHYSICAL THERAPY | Facility: CLINIC | Age: 41
Setting detail: THERAPIES SERIES
Discharge: HOME | End: 2021-04-13
Payer: COMMERCIAL

## 2021-04-13 NOTE — CONSULTS
[] [facility]) [facility] &  Therapy  [address]    [phone]  F: [phone]   [] [address]  [facility] [address]   [address]  P: [phone]  F: [phone]  [] [address] &  Therapy  [address] [facility]  P: [phone]  F: [phone] [] [address]  P: [phone]  F: [phone]  [x] [address]  [address] [address] [facility] [address]   [address]   [place]: [phone]  F: [phone]   [] [facility]  [address] [facility] [address]  [place]: [phone]   F: [phone]     Physical Therapy Cancel/No Show note    Date: 2021  Patient: Lc Myrick  : 1980  MRN: [id number]    Cancels/No Shows to date:     For today's appointment patient:    []  Cancelled    [] Rescheduled appointment    [x] No-show     Reason given by patient:    []  Patient ill    []  Conflicting appointment    [] No transportation      [] Conflict with work    [x] No reason given    [] Weather related    [] [id number]     [] Other:      Comments:          Electronically signed by: Tommie Grier

## 2021-04-15 ENCOUNTER — HOSPITAL ENCOUNTER (OUTPATIENT)
Dept: PHYSICAL THERAPY | Facility: CLINIC | Age: 41
Setting detail: THERAPIES SERIES
Discharge: HOME | End: 2021-04-15
Payer: COMMERCIAL

## 2021-04-15 NOTE — CONSULTS
[] [facility]) - [facility] &  Therapy  [address]    [phone]  F: [phone]   [] [address]  [facility] [address]   [address]  P: [phone]  F: [phone]  [] [address] &  Therapy  [address]  P: [phone]  F: [phone] [] [address]  P: [phone]  F: [phone]  [x] [address]  [address] [address] [facility]   [address]   [place]: [phone]  F: [phone]   [] [address]  [place]: [phone]   F: [phone]     Physical Therapy Cancel/No Show note    Date: 4/15/2021  Patient: Ino Dickens  : 1980  MRN: [id number]    Cancels/No Shows to date: 1/3    For today's appointment patient:    []  Cancelled    [] Rescheduled appointment    [x] No-show     Reason given by patient:    []  Patient ill    []  Conflicting appointment    [] No transportation      [] Conflict with work    [x] No reason given    [] Weather related    [] [id number]     [] Other:      Comments:          Electronically signed by: Neelam Lange

## 2021-05-19 DIAGNOSIS — E11.65 UNCONTROLLED TYPE 2 DIABETES MELLITUS WITH HYPERGLYCEMIA (HCC): ICD-10-CM

## 2021-05-19 NOTE — TELEPHONE ENCOUNTER
Moriah Winkler is calling to request a refill on the following medication(s):    Medication Request:  Requested Prescriptions     Pending Prescriptions Disp Refills    metFORMIN (GLUCOPHAGE) 1000 MG tablet [Pharmacy Med Name: METFORMIN HCL 1,000 MG TABLET] 180 tablet 0     Sig: TAKE 1 TABLET BY MOUTH TWICE A DAY WITH MEALS     Last filled 2/22/21 90 day no refill  Order pending    Last Visit Date (If Applicable):  6/7/9402    Next Visit Date:    6/1/2021

## 2021-05-24 RX ORDER — SITAGLIPTIN 100 MG/1
TABLET, FILM COATED ORAL
Qty: 90 TABLET | Refills: 1 | Status: SHIPPED | OUTPATIENT
Start: 2021-05-24

## 2021-05-24 NOTE — TELEPHONE ENCOUNTER
Jennifer Kelly is calling to request a refill on the following medication(s):    Medication Request:  Requested Prescriptions     Pending Prescriptions Disp Refills    JANUVIA 100 MG tablet [Pharmacy Med Name: Todd Galicia 100 MG TABLET] 90 tablet 1     Sig: TAKE 1 TABLET BY MOUTH EVERY DAY     Last filled 10/20/20 90 day 1 refill  Order pending    Last Visit Date (If Applicable):  0/9/1339    Next Visit Date:    5/24/2021

## 2021-05-25 RX ORDER — LISINOPRIL 5 MG/1
TABLET ORAL
Qty: 90 TABLET | Refills: 0 | Status: SHIPPED | OUTPATIENT
Start: 2021-05-25

## 2021-05-25 NOTE — TELEPHONE ENCOUNTER
Roosevelt Rivera is calling to request a refill on the following medication(s):    Medication Request:    Last filled 10/20/2020 #90 with 1 RF    Requested Prescriptions     Pending Prescriptions Disp Refills    lisinopril (PRINIVIL;ZESTRIL) 5 MG tablet [Pharmacy Med Name: LISINOPRIL 5 MG TABLET] 90 tablet 1     Sig: TAKE 1 TABLET BY MOUTH EVERY DAY       Last Visit Date (If Applicable):  6/1/5088    Next Visit Date:    6/1/2021

## 2021-05-26 RX ORDER — ARIPIPRAZOLE 5 MG/1
TABLET ORAL
Qty: 90 TABLET | Refills: 0 | OUTPATIENT
Start: 2021-05-26

## 2021-06-01 ENCOUNTER — OFFICE VISIT (OUTPATIENT)
Dept: INTERNAL MEDICINE CLINIC | Age: 41
End: 2021-06-01
Payer: COMMERCIAL

## 2021-06-01 DIAGNOSIS — F41.9 ANXIETY AND DEPRESSION: ICD-10-CM

## 2021-06-01 DIAGNOSIS — F32.A ANXIETY AND DEPRESSION: ICD-10-CM

## 2021-06-01 DIAGNOSIS — E11.65 UNCONTROLLED TYPE 2 DIABETES MELLITUS WITH HYPERGLYCEMIA (HCC): ICD-10-CM

## 2021-06-01 DIAGNOSIS — I10 ESSENTIAL HYPERTENSION: ICD-10-CM

## 2021-06-01 DIAGNOSIS — E78.2 MIXED HYPERLIPIDEMIA: ICD-10-CM

## 2021-06-01 DIAGNOSIS — F17.200 SMOKER: Primary | ICD-10-CM

## 2021-06-01 DIAGNOSIS — J44.9 CHRONIC OBSTRUCTIVE PULMONARY DISEASE, UNSPECIFIED COPD TYPE (HCC): ICD-10-CM

## 2021-06-01 DIAGNOSIS — E66.9 OBESITY (BMI 30-39.9): ICD-10-CM

## 2021-06-01 PROCEDURE — G8428 CUR MEDS NOT DOCUMENT: HCPCS | Performed by: FAMILY MEDICINE

## 2021-06-01 PROCEDURE — G8926 SPIRO NO PERF OR DOC: HCPCS | Performed by: FAMILY MEDICINE

## 2021-06-01 PROCEDURE — 3046F HEMOGLOBIN A1C LEVEL >9.0%: CPT | Performed by: FAMILY MEDICINE

## 2021-06-01 PROCEDURE — G8417 CALC BMI ABV UP PARAM F/U: HCPCS | Performed by: FAMILY MEDICINE

## 2021-06-01 PROCEDURE — 3023F SPIROM DOC REV: CPT | Performed by: FAMILY MEDICINE

## 2021-06-01 PROCEDURE — 99214 OFFICE O/P EST MOD 30 MIN: CPT | Performed by: FAMILY MEDICINE

## 2021-06-01 PROCEDURE — 2022F DILAT RTA XM EVC RTNOPTHY: CPT | Performed by: FAMILY MEDICINE

## 2021-06-01 PROCEDURE — 4004F PT TOBACCO SCREEN RCVD TLK: CPT | Performed by: FAMILY MEDICINE

## 2021-06-01 NOTE — PROGRESS NOTES
Subjective:          2021    TELEHEALTH EVALUATION -- Audio/Visual (During DOYVQ-14 public health emergency)    HPI:    Moriah Winkler (:  1980) has requested an audio/video evaluation for the following concern(s):    Uncontrolled non-insulin-dependent diabetes mellitus  Hypertension  Hyperlipidemia  COPD  Anxiety/depression    Review of Systems    Prior to Visit Medications    Medication Sig Taking?  Authorizing Provider   lisinopril (PRINIVIL;ZESTRIL) 5 MG tablet TAKE 1 TABLET BY MOUTH EVERY DAY  Alonso Ornelas MD   JANUVIA 100 MG tablet TAKE 1 TABLET BY MOUTH EVERY DAY  Alonso Ornelas MD   metFORMIN (GLUCOPHAGE) 1000 MG tablet TAKE 1 TABLET BY MOUTH TWICE A DAY WITH MEALS  Alonso Ornelas MD   atorvastatin (LIPITOR) 40 MG tablet TAKE 1 TABLET BY MOUTH EVERY DAY  Alonso Ornelas MD   sertraline (ZOLOFT) 25 MG tablet Take 25 mg by mouth daily  Historical Provider, MD   ARIPiprazole (ABILIFY) 5 MG tablet Take 1 tablet by mouth daily  Alonso Ornelas MD   aspirin EC 81 MG EC tablet Take 1 tablet by mouth daily  Alonso Ornelas MD   Blood Glucose Monitoring Suppl (ACURA BLOOD GLUCOSE METER) w/Device KIT Test 2-3 times a day  Alonso Ornelas MD   blood glucose monitor strips Test 3 times a day  Alonso Ornelas MD   Lancets MISC 1 each by Does not apply route 3 times daily  Alonso Ornelas MD   fluticasone-umeclidin-vilant (TRELEGY ELLIPTA) 100-62.5-25 MCG/INH AEPB Inhale 1 puff into the lungs daily  Alonso Ornelas MD   Spacer/Aero-Holding Chambers (E-Z SPACER) ZULAY 1 Device by Does not apply route daily as needed (sob)  Alonso Ornelas MD       Social History     Tobacco Use    Smoking status: Current Every Day Smoker     Packs/day: 1.00     Years: 19.00     Pack years: 19.00     Types: Cigarettes     Start date: 2001    Smokeless tobacco: Never Used   Substance Use Topics    Alcohol use: Never    Drug use: Never        Allergies   Allergen Reactions    Metformin Diarrhea       PHYSICAL EXAMINATION:  [ INSTRUCTIONS:  \"[x]\" Indicates a positive item  \"[]\" Indicates a negative item  -- DELETE ALL ITEMS NOT EXAMINED]  Vital Signs: (As obtained by patient/caregiver or practitioner observation)    Blood pressure-  Heart rate-    Respiratory rate-    Temperature-  Pulse oximetry-     Constitutional: [x] Appears well-developed and well-nourished [x] No apparent distress      [] Abnormal-   Mental status  [x] Alert and awake  [x] Oriented to person/place/time [x]Able to follow commands      Eyes:  EOM    []  Normal  [] Abnormal-  Sclera  [x]  Normal  [] Abnormal -         Discharge [x]  None visible  [] Abnormal -    HENT:   [x] Normocephalic, atraumatic. [] Abnormal   [x] Mouth/Throat: Mucous membranes are moist.     External Ears [x] Normal  [] Abnormal-     Neck: [x] No visualized mass     Pulmonary/Chest: [] Respiratory effort normal.  [] No visualized signs of difficulty breathing or respiratory distress        [x] Abnormal-COPD     Musculoskeletal:   [x] Normal gait with no signs of ataxia         [x] Normal range of motion of neck        [] Abnormal-       Neurological:        [x] No Facial Asymmetry (Cranial nerve 7 motor function) (limited exam to video visit)          [x] No gaze palsy        [] Abnormal-         Skin:        [x] No significant exanthematous lesions or discoloration noted on facial skin         [] Abnormal-            Psychiatric:       [] Normal Affect [] No Hallucinations        [x] Abnormal-anxiety/depression  Other pertinent observable physical exam findings-     ASSESSMENT/PLAN:  72-year-old male with underlying history significant for uncontrolled insulin-dependent diabetes mellitus with A1c of 9.3 as of 2021. Patient states that he has been adjusting his own Metformin from once a day to twice a day depending on his personal perception of hypoglycemia. He denies any symptom of hypoglycemia. He states that he has not been taking Januvia.   I had a long discussion with this individual regarding the importance of compliance with medications as directed, keep Accu-Chek log for office review, adhere to ADA 1800 diet, daily moderate exercise and lifestyle change and weight reduction to keep BMI around 25  Hypertension. We aim to keep his blood pressure equal less than 130/80. Consume less than 2 g of salt a day. Optimize diabetes  He is counseled diabetic foot care  He is advised to update his annual dilated eye exam next anxiety/depression. Continue Abilify, Zoloft. COPD. Continue beta agonist and inhaled corticosteroid. He denies recent pulmonary decompensation. He is counseled on tobacco, secondhand smoking  Consider COVID-19 vaccination  Med list reviewed advised to continue with compliance  Further recommendations to follow labs    Continue statin that he is tolerating well    No follow-ups on file. Laura Boyd, was evaluated through a synchronous (real-time) audio-video encounter. The patient (or guardian if applicable) is aware that this is a billable service. Verbal consent to proceed has been obtained within the past 12 months. The visit was conducted pursuant to the emergency declaration under the [address] [place] [address], [address] [place] [address] authority and the [employer] General Act. Patient identification was verified, and a caregiver was present when appropriate. The patient was located in a state where the provider was credentialed to provide care. Total time spent on this encounter: 24 minutes    --Sulaiman Cortés MD on 6/1/2021 at 11:04 AM    An electronic signature was used to authenticate this note. This note is created with a voice recognition program and while intend to generate a document that accurately reflects the content of the visit, no guarantee can be provided that every mistake has been identified and corrected by editing.       Sulaiman Cortés MD